# Patient Record
Sex: FEMALE
[De-identification: names, ages, dates, MRNs, and addresses within clinical notes are randomized per-mention and may not be internally consistent; named-entity substitution may affect disease eponyms.]

---

## 2018-05-10 ENCOUNTER — HOSPITAL ENCOUNTER (INPATIENT)
Dept: HOSPITAL 42 - ED | Age: 68
LOS: 2 days | Discharge: HOME | DRG: 83 | End: 2018-05-12
Attending: INTERNAL MEDICINE | Admitting: INTERNAL MEDICINE
Payer: MEDICARE

## 2018-05-10 DIAGNOSIS — Z87.891: ICD-10-CM

## 2018-05-10 DIAGNOSIS — F41.9: ICD-10-CM
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
## 2018-05-10 DIAGNOSIS — Z85.3: ICD-10-CM
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
## 2018-05-10 DIAGNOSIS — E87.0: ICD-10-CM

## 2018-05-10 DIAGNOSIS — E78.00: ICD-10-CM

## 2018-05-10 DIAGNOSIS — R73.03: ICD-10-CM

## 2018-05-10 DIAGNOSIS — S06.6X9A: Primary | ICD-10-CM

## 2018-05-10 DIAGNOSIS — Z90.13: ICD-10-CM

## 2018-05-10 DIAGNOSIS — Z92.3: ICD-10-CM

## 2018-05-10 DIAGNOSIS — N39.0: ICD-10-CM

## 2018-05-10 DIAGNOSIS — E87.5: ICD-10-CM

## 2018-05-10 DIAGNOSIS — Z86.73: ICD-10-CM

## 2018-05-10 DIAGNOSIS — Z92.21: ICD-10-CM

## 2018-05-10 DIAGNOSIS — E83.42: ICD-10-CM

## 2018-05-10 DIAGNOSIS — I10: ICD-10-CM

## 2018-05-10 LAB
ALBUMIN SERPL-MCNC: 4.3 G/DL (ref 3–4.8)
ALBUMIN/GLOB SERPL: 1.4 {RATIO} (ref 1.1–1.8)
ALT SERPL-CCNC: 39 U/L (ref 7–56)
APPEARANCE UR: CLEAR
AST SERPL-CCNC: 33 U/L (ref 14–36)
BACTERIA #/AREA URNS HPF: (no result) /[HPF]
BASOPHILS # BLD AUTO: 0.02 K/MM3 (ref 0–2)
BASOPHILS NFR BLD: 0.2 % (ref 0–3)
BILIRUB UR-MCNC: NEGATIVE MG/DL
BNP SERPL-MCNC: 707 PG/ML (ref 0–450)
BUN SERPL-MCNC: 21 MG/DL (ref 7–21)
CALCIUM SERPL-MCNC: 9.8 MG/DL (ref 8.4–10.5)
COLOR UR: YELLOW
EOSINOPHIL # BLD: 0.1 10*3/UL (ref 0–0.7)
EOSINOPHIL NFR BLD: 0.6 % (ref 1.5–5)
ERYTHROCYTE [DISTWIDTH] IN BLOOD BY AUTOMATED COUNT: 13.5 % (ref 11.5–14.5)
GFR NON-AFRICAN AMERICAN: > 60
GLUCOSE UR STRIP-MCNC: NEGATIVE MG/DL
GRANULOCYTES # BLD: 7.27 10*3/UL (ref 1.4–6.5)
GRANULOCYTES NFR BLD: 75.1 % (ref 50–68)
HDLC SERPL-MCNC: 54 MG/DL (ref 29–60)
HGB BLD-MCNC: 14.4 G/DL (ref 12–16)
LDLC SERPL-MCNC: 92 MG/DL (ref 0–129)
LEUKOCYTE ESTERASE UR-ACNC: (no result) LEU/UL
LYMPHOCYTES # BLD: 1.8 10*3/UL (ref 1.2–3.4)
LYMPHOCYTES NFR BLD AUTO: 18.6 % (ref 22–35)
MCH RBC QN AUTO: 29.8 PG (ref 25–35)
MCHC RBC AUTO-ENTMCNC: 34 G/DL (ref 31–37)
MCV RBC AUTO: 87.8 FL (ref 80–105)
MONOCYTES # BLD AUTO: 0.5 10*3/UL (ref 0.1–0.6)
MONOCYTES NFR BLD: 5.5 % (ref 1–6)
PH UR STRIP: 6 [PH] (ref 4.7–8)
PLATELET # BLD: 246 10^3/UL (ref 120–450)
PMV BLD AUTO: 11.1 FL (ref 7–11)
PROT UR STRIP-MCNC: NEGATIVE MG/DL
RBC # BLD AUTO: 4.83 10^6/UL (ref 3.5–6.1)
RBC # UR STRIP: NEGATIVE /UL
RBC #/AREA URNS HPF: (no result) /HPF (ref 0–2)
SP GR UR STRIP: <= 1.005 (ref 1–1.03)
TROPONIN I SERPL-MCNC: < 0.01 NG/ML
UROBILINOGEN UR STRIP-ACNC: 0.2 E.U./DL
WBC # BLD AUTO: 9.7 10^3/UL (ref 4.5–11)

## 2018-05-10 NOTE — RAD
PROCEDURE:  Radiographs of the pelvis and bilateral hips



HISTORY:

trauma?  



COMPARISON:

None.



FINDINGS:



BONES:

Pelvis: Unremarkable.



Right hip:Unremarkable.



Left hip:Unremarkable.



JOINTS:

Right hip: Unremarkable.



Left hip: Unremarkable.



Sacroiliac Joints: Unremarkable.



Pubic symphysis: Unremarkable.



SOFT TISSUES:

Normal. 



OTHER FINDINGS:

None.



IMPRESSION:

Unremarkable radiographs of the hips and pelvis.

## 2018-05-10 NOTE — CP.PCM.PN
Subjective





- Date & Time of Evaluation


Date of Evaluation: 05/10/18


Time of Evaluation: 17:13





- Subjective


Subjective: 


Spoke to Dr Biggs, radiologist, who reports that there is NO brain edema on 

repeat CT head, and the SAH is diminished in size. 


Will continue to monitor. 





Objective





- Vital Signs/Intake and Output


Vital Signs (last 24 hours): 


 











Temp Pulse Resp BP Pulse Ox


 


 97.9 F   77   12   161/85 H  95 


 


 05/10/18 09:45  05/10/18 14:50  05/10/18 14:50  05/10/18 14:00  05/10/18 14:40











- Medications


Medications: 


 Current Medications





Acetaminophen (Tylenol 325mg Tab)  650 mg PO Q6H PRN


   PRN Reason: Pain, Mild (1-3)


Dexamethasone (Decadron Inj)  4 mg IVP Q6H [facility]


Sodium Chloride (Sodium Chloride 0.9%)  1,000 mls @ 50 mls/hr IV .Q20H [facility]


   Last Admin: 05/10/18 11:27 Dose:  50 mls/hr


Ceftriaxone Sodium (Rocephin 1 Gram Ivpb)  1 gm in 100 mls @ 100 mls/hr IVPB 

DAILY [facility]


   PRN Reason: Protocol


Labetalol HCl (Trandate)  5 mg IV Q4H PRN


   PRN Reason: Systolic Blood Pressure


Metoclopramide HCl (Reglan)  10 mg IVP Q6H PRN


   PRN Reason: Nausea/Vomiting


Morphine Sulfate (Morphine)  2 mg IVP Q4H PRN


   PRN Reason: Pain, moderate (4-7)


   Last Admin: 05/10/18 14:48 Dose:  2 mg


Ondansetron HCl (Zofran Inj)  4 mg IVP Q6H PRN


   PRN Reason: Nausea/Vomiting


   Last Admin: 05/10/18 16:11 Dose:  4 mg


Pantoprazole Sodium (Protonix Inj)  40 mg IVP DAILY [facility]

## 2018-05-10 NOTE — CARD
--------------- APPROVED REPORT --------------





EKG Measurement

Heart [id number]

MA [id number]

[id number]

[id number]

[id number]



<Conclusion>

Normal sinus rhythm

STTW changes

## 2018-05-10 NOTE — CP.PCM.HP
<Geno Momin - Last Filed: 05/10/18 15:42>





History of Present Illness





- History of Present Illness


History of Present Illness: 


CC: post fall in cruise ship.





Patient is 69 yo female with PMHx of HTN, HLD, SVT, breast cancer x2 ( s/p 

surgery, chemo and radiation), anxiety disorder, h/o TIAs brought in post fall 

in cruise [place]. Patient states she doesn't remember the event, only remember 

being in the hospital. As per patient's , patient was walking down the 

stairs when her shoe got caught between some objects causing her to trip and 

fall, hitting the back of her head. As per patient's  patient was verbal 

post event while in the cruise ship.  denies noting any seizure like 

activity. Denies bowel or bladder incontinent. 


Patient currently states the headache has resolved. Denies cp, sob, no nausea, 

vomiting or diarrhea. Was in her normal state of health prior to the event. Was 

in the cruise ship for 5 days.  admits patient had couple of drinks the 

night before. Denies illicit drug use. 





 Patient had CT head in the er, revealing small frontal SAH. 





PMHx: htn, hyperlipidemia, anxiety, SVT, breast cancer x2, h/o TIAs


PSHx: myrna mastectomy, knee surgery, rotator cuff repair, loop recorder in the 

past ( removed). 


FMHx: Alzheimer 


Social: former tobacco , quit 30 years, social alcohol, denies illicit drug 

use. 


Home meds: as per chart.





Present on Admission





- Present on Admission


Any Indicators Present on Admission: No


History of DVT/PE: No


History of Uncontrolled Diabetes: No


Urinary Catheter: No


Decubitus Ulcer Present: No





Review of Systems





- EENT


Eyes: absent: Blurred Vision, Change in Vision, Diplopia, Itchy Eyes, Sees 

Flashes


Ears: absent: Decreased Hearing, Disequilibrium, Dizziness


Nose/Mouth/Throat: absent: As Per HPI, Epistaxis, Nasal Congestion, Nasal 

Discharge, Nasal Obstruction, Nasal Trauma, Nose Pain, Post Nasal Drip, Sinus 

Pain, Sinus Pressure, Bleeding Gums, Change in Voice, Dental Pain, Dry Mouth, 

Dysphagia, Halitosis, Hoarsness, Lip Swelling, Mouth Lesions, Mouth Pain, 

Odynophagia, Sore Throat, Throat Swelling, Tongue Swelling, Facial Pain, Neck 

Pain, Neck Mass, Other





- Cardiovascular


Cardiovascular: absent: As Per HPI, Acrocyanosis, Chest Pain, Chest Pain at Rest

, Chest Pain with Activity, Claudication, Diaphoresis, Dyspnea, Dyspnea on 

Exertion, Edema, Irregular Heart Rhythm, Pain Radiating to Arm/Neck/Jaw, Leg 

Edema, Leg Ulcers, Lightheadedness, Orthopnea, Palpitations, Paroxysmal 

Nocturnal Dyspnea, Pedal Edema, Radiating Pain, Rapid Heart Rate, Slow Heart 

Rate, Syncope, Other





- Respiratory


Respiratory: absent: As Per HPI, Cough, Dyspnea, Hemoptysis, Dyspnea on Exertion

, Wheezing, Snoring, Stridor, Pain on Inspiration, Chest Congestion, Excessive 

Mucous Production, Change in Mucous Color, Pain with Coughing, Other





- Gastrointestinal


Gastrointestinal: absent: As Per HPI, Abdominal Pain, Belching, Bloating, 

Change in Bowel Habits, Change in Stool Character, Coffee Ground Emesis, 

Constipation, Cramping, Diarrhea, Dyspepsia, Dysphagia, Early Satiety, 

Excessive Flatus, Fecal Incontinence, Heartburn, Hematemesis, Hematochezia, 

Loose Stools, Melena, Nausea, Odynophagia, Temesmus, Vomiting, Other





- Genitourinary


Genitourinary: absent: Dysuria, Hematuria, Nocturia, Urinary Frequency, Freq UTI





- Musculoskeletal


Musculoskeletal: absent: As Per HPI, Abnormal Gait, Arthralgias, Atrophy, Back 

Pain, Deformity, Joint Swelling, Limited Range of Motion, Loss of Height, 

Muscle Cramps, Muscle Weakness, Myalgias, Neck Pain, Numbness, Radiating Pain 

into Limb, Stiffness, Tingling, Other





- Integumentary


Integumentary: absent: Dry Skin, Swelling





- Neurological


Neurological: Syncope.  absent: Abnormal Hearing, Abnormal Speech, Burning 

Sensations, Disequilibrium, Dizziness, Numbness, Frequent Falls, Headaches, 

Loss of Vision, Paresthesias, Restless Legs, Tingling, Tremor, Vertigo, Weakness





- Psychiatric


Psychiatric: absent: Confusion, Depression





- Endocrine


Endocrine: absent: Flushing, Polydipsia, Polyphagia, Polyuria





- Hematologic/Lymphatic


Hematologic: absent: Easy Bleeding





Past Patient History





- Infectious Disease


Hx of Infectious Diseases: None





- Tetanus Immunizations


Tetanus Immunization: Unknown





- Past Medical History & Family History


Past Medical History?: Yes





- Past Social History


Smoking Status: Former Smoker


Alcohol: Occasional


Drugs: Denies


Home Situation {Lives}: With Family





- CARDIAC


Hx Hypertension: Yes





- PULMONARY


Hx Respiratory Disorders: No





- GENITOURINARY/GYNECOLOGICAL


Other/Comment: breast ca





- PSYCHIATRIC


Hx Psychophysiologic Disorder: No


Hx Substance Use: No





- ANESTHESIA


Hx Anesthesia: Yes


Hx Anesthesia Reactions: No





Meds


Allergies/Adverse Reactions: 


 Allergies











Allergy/AdvReac Type Severity Reaction Status Date / Time


 


Sulfa (Sulfonamide Allergy  RASH Verified 05/10/18 09:43





Antibiotics)     














Physical Exam





- Constitutional


Appears: No Acute Distress, Older Than Stated Age





- Head Exam


Head Exam: ATRAUMATIC, NORMAL INSPECTION, NORMOCEPHALIC





- Eye Exam


Eye Exam: EOMI, Normal appearance, PERRL.  absent: Scleral icterus


Pupil Exam: NORMAL ACCOMODATION





- ENT Exam


ENT Exam: Mucous Membranes Moist





- Neck Exam


Neck exam: Positive for: Normal Inspection





- Respiratory Exam


Respiratory Exam: Clear to Auscultation Bilateral, NORMAL BREATHING PATTERN.  

absent: Rales, Rhonchi, Wheezes, Respiratory Distress, Stridor





- Cardiovascular Exam


Cardiovascular Exam: REGULAR RHYTHM, RRR, +S1, +S2.  absent: Bradycardia, 

Tachycardia, Diastolic murmur, Gallop, Systolic Murmur





- GI/Abdominal Exam


GI & Abdominal Exam: Normal Bowel Sounds, Soft.  absent: Distended, Firm, 

Guarding, Rebound, Rigid, Tenderness





- Extremities Exam


Extremities exam: Positive for: normal inspection.  Negative for: pedal edema, 

tenderness





- Back Exam


Back exam: NORMAL INSPECTION.  absent: vertebral tenderness





- Neurological Exam


Neurological exam: Alert, CN II-XII Intact, Oriented x3, Reflexes Normal





- Psychiatric Exam


Psychiatric exam: Anxious





- Skin


Skin Exam: Dry, Normal Color





Results





- Vital Signs


Recent Vital Signs: 





 Last Vital Signs











Temp  97.9 F   05/10/18 09:45


 


Pulse  76   05/10/18 13:12


 


Resp  18   05/10/18 13:12


 


BP  174/101 H  05/10/18 13:12


 


Pulse Ox  98   05/10/18 13:12














- Labs


Result Diagrams: 


 05/10/18 10:00





 05/10/18 10:00


Labs: 





 Laboratory Results - last 24 hr











  05/10/18





  12:15


 


Blood Type Confirm  O POSITIVE














- EKG Data


EKG Interpreted by: Myself


EKG shows normal: Sinus rhythm


Rate: Normal





Assessment & Plan





- Assessment and Plan (Free Text)


Assessment: 


Patient is 69 yo female with PMHx of HTN, HLD, SVT, breast cancer x2 ( s/p 

surgery, chemo and radiation), anxiety disorder, h/o TIAs brought in post fall 

in cruise ship.


Plan: 


1) Fall likely syncopal episode  due to cardiac (giving h/o SVTs) r/o AS, 

versus CVA versus metabolic, alcohol intoxication, infectious, r/o seizures  


  - Patient to be monitored in the ICU due to traumatic brain hemorrhage 


  - Trop neg x1, will trend


  - echo ordered


  - Cardiologist consulted 


  - CT with small mount of post traumatic subarachnoid hemorrhage on anterior 

surface of left frontal lobe- neurosurgery was consulted, recommending repeat 

CT head in 6 hours to evaluate for expansion


  - CT cervical with no acute finding. 


 - Neurology consulted 


 - Will maintain BP below 170/100.


 - TSH ordered, 


 - Electrolytes are being corrected 


 - UTI is being treated


 - Glucose was normal on arrival 


 - alcohol level ordered, utox normal 


 - Neuro check q2hr


 - Avoid hypotensive, hypoglycemic episodes 


 - Carotid us ordered. 


 - Speech and swallow.


 - fall precaution


 - Avoid ASA, and anticoags. 


 - Hpi/pelvic x-ray normal, Morphine prn for pain.


 








2) Uncontrolled htn - labetalol prn to maintain SBP below 170.


                           - will hold po meds. 








3) Hypernatremia- Gentle hydration 








4) Hyperkalemia- likely due to acei, kayexalate ordered





5) Hypomagnesemia- being repleted. 





6) UTI- 


    - urine culture pending


    - afebrile, no leukocytosis


    - s/p Rocephin in the ED, will continue for now. 








7) h/o SVT- ekg normal, will monitor tele








8) Elevated pro bnp- no signs of fluid overload, patient lying flat in bed, and 

able to speak in full sentences without being sob. 





9) DVT/gi prophylaxis: VTE device and protonix. 





Patient seen, examined and case discussed with Dr Manning. 





- Date & Time


Date: 05/10/18


Time: 13:35





<Windy Manning - Last Filed: 05/10/18 15:56>





Results





- Vital Signs


Recent Vital Signs: 





 Last Vital Signs











Temp  97.9 F   05/10/18 09:45


 


Pulse  77   05/10/18 14:50


 


Resp  12   05/10/18 14:50


 


BP  161/85 H  05/10/18 14:00


 


Pulse Ox  95   05/10/18 14:40














- Labs


Result Diagrams: 


 05/10/18 10:00





 05/10/18 10:00


Labs: 





 Laboratory Results - last 24 hr











  05/10/18 05/10/18 05/10/18





  12:15 13:50 13:50


 


Triglycerides    127


 


Cholesterol    174


 


LDL Cholesterol Direct    92


 


HDL Cholesterol    54


 


TSH 3rd Generation   1.65 


 


Alcohol, Quantitative   < 10 


 


Blood Type Confirm  O POSITIVE  














Attending/Attestation





- Attestation


I have personally seen and examined this patient.: Yes


I have fully participated in the care of the patient.: Yes


I have reviewed all pertinent clinical information: Yes


Notes (Text): 





05/10/18 15:50


68 year old female with past medical history of hypertension, dyslipidemia, SVT

, anxiety, breast cancer s/p surgery, and history of TIAs who presented s/p 

fall with possible syncopal episode.  CT head showed small amount of post-

traumatic subarachnoid hemorrhage left frontal lobe.  Neurosurgery was notified 

who recommended to repeat CT head in 6 hours.  Neurology and cardiology 

evaluation are requested.  Will obtain serial cardiac enzymes, echocardiogram, 

carotid dopplers.  Urine drug scree and alcohol level are ordered.  Will 

monitor in ICU.


Started on antibiotics for possible UTI while awaiting Ucx.


She was given kayexalate for hyperkalemia.  Will monitor and hold zestril for 

now.


Will replete and repeat magnesium.  Monitor sodium for mild hypernatremia.


Can start lopressor or labetalol prn for hypertension.





Windy Manning MD


Hospitalist.

## 2018-05-10 NOTE — US
PROCEDURE:  Bilateral carotid artery duplex ultrasound 



HISTORY:

Carotid stenosis 



PHYSICIAN(S):  Brooks Spencer MD.



TECHNIQUE:

Duplex sonography and color-flow Doppler were used to evaluate the 

carotid bifurcations and limited segments of the vertebral arteries 

bilaterally.



FINDINGS:

There is mild smooth heterogeneous plaque noted at the carotid 

bifurcations bilaterally. The peak systolic velocity in the proximal 

right internal carotid artery is 88 cm/sec. This corresponds to a 20 

to 39% proximal right ICA stenosis. Normal systolic velocities are 

noted in the proximal right external carotid artery. There is 

antegrade flow in the right vertebral artery.



The peak systolic velocity in the proximal left internal carotid 

artery is 82 cm/sec. This corresponds to a 20 to 39% proximal left 

ICA stenosis. Normal systolic velocities are noted in the proximal 

left external carotid artery. There is antegrade flow in the left 

vertebral artery.



IMPRESSION:

1. Bilateral 20-39% proximal ICA stenoses.



2. Antegrade flow in both vertebral arteries.

## 2018-05-10 NOTE — CT
PROCEDURE:  CT Cervical Spine without contrast



HISTORY:

head injury, altered?



COMPARISON:

None available.



TECHNIQUE:

Axial computed tomography images were obtained of the cervical spine 

without the use of intravenous contrast. Coronal and sagittal 

reformatted images were created and reviewed.



Radiation dose: 



Total exam DLP = 635 mGy-cm.



This CT exam was performed using one or more of the following dose 

reduction techniques: Automated exposure control, adjustment of the 

mA and/or kV according to patient size, and/or use of iterative 

reconstruction technique.



FINDINGS:



VERTEBRAE:

No fracture. Normal alignment. No destructive bony lesion.



DISCS/SPINAL CANAL/NEURAL FORAMINA:

No significant central canal or neural foraminal stenosis. Small 

central disc protrusion at C2-3



PARASPINAL SOFT TISSUES:

Unremarkable. 



OTHER FINDINGS:

None.



IMPRESSION:

No acute findings

## 2018-05-10 NOTE — CP.PCM.PN
Subjective





- Date & Time of Evaluation


Date of Evaluation: 05/10/18


Time of Evaluation: 19:10





- Subjective


Subjective: 





small amount of traumatic SAH seen on CT


repeat shows furhter decrese in blood


no neurosurgical intervention indicated


can be d/c when otherwise medically clear





Objective





- Vital Signs/Intake and Output


Vital Signs (last 24 hours): 


 











Temp Pulse Resp BP Pulse Ox


 


 97.9 F   66   16   158/76 H  99 


 


 05/10/18 09:45  05/10/18 17:50  05/10/18 17:50  05/10/18 17:22  05/10/18 17:50











- Medications


Medications: 


 Current Medications





Acetaminophen (Tylenol 325mg Tab)  650 mg PO Q6H PRN


   PRN Reason: Pain, Mild (1-3)


Sodium Chloride (Sodium Chloride 0.9%)  1,000 mls @ 50 mls/hr IV .Q20H [facility]


   Last Admin: 05/10/18 11:27 Dose:  50 mls/hr


Ceftriaxone Sodium (Rocephin 1 Gram Ivpb)  1 gm in 100 mls @ 100 mls/hr IVPB 

DAILY [facility]


   PRN Reason: Protocol


Labetalol HCl (Trandate)  5 mg IV Q4H PRN


   PRN Reason: Systolic Blood Pressure


Metoclopramide HCl (Reglan)  10 mg IVP Q6H PRN


   PRN Reason: Nausea/Vomiting


   Last Admin: 05/10/18 17:44 Dose:  10 mg


Morphine Sulfate (Morphine)  2 mg IVP Q4H PRN


   PRN Reason: Pain, moderate (4-7)


   Last Admin: 05/10/18 14:48 Dose:  2 mg


Ondansetron HCl (Zofran Inj)  4 mg IVP Q6H PRN


   PRN Reason: Nausea/Vomiting


   Last Admin: 05/10/18 16:11 Dose:  4 mg


Pantoprazole Sodium (Protonix Inj)  40 mg IVP DAILY [facility]

## 2018-05-10 NOTE — CT
PROCEDURE:  CT HEAD WITHOUT CONTRAST.



HISTORY:

head injury, possible LOC, bleed?



COMPARISON:

None available. 



TECHNIQUE:

Axial computed tomography images were obtained through the head/brain 

without intravenous contrast.  



Radiation dose:



Total exam DLP = 910 mGy-cm.



This CT exam was performed using one or more of the following dose 

reduction techniques: Automated exposure control, adjustment of the 

mA and/or kV according to patient size, and/or use of iterative 

reconstruction technique.



FINDINGS:



HEMORRHAGE:

There is a small amount of subarachnoid blood on the surface of the 

anterior left frontal lobe. The finding is seen on images 34-40 of 

series 4.



I discussed the findings with the physician's assistant Layne at 10:45 

a.m. 



BRAIN:

No mass effect or edema.  No atrophy or chronic microvascular 

ischemic changes.



VENTRICLES:

Unremarkable. No hydrocephalus. 



CALVARIUM:

Unremarkable.



PARANASAL SINUSES:

Unremarkable as visualized. No significant inflammatory changes.



MASTOID AIR CELLS:

Unremarkable as visualized. No inflammatory changes.



OTHER FINDINGS:

None.



IMPRESSION:

Small amount of posttraumatic subarachnoid hemorrhage on the anterior 

surface of the left frontal lobe.

## 2018-05-10 NOTE — ED PDOC
Arrival/HPI





- General


Chief Complaint: Trauma


Time Seen by Provider: 05/10/18 09:47


Historian: Patient





- History of Present Illness


Narrative History of Present Illness (Text): 





05/10/18 10:05


67 y/o female, pmh including htn/hyperlipidemia, psychiatric history including 

mild anxiety, sulfa allergy, , GCS 15, alert and oriented to person and 

time but not to place, biba s[place] cruise [place] this [place], limited 

HPI can be obtained as the patient doesn't remember what happen.  Pt. stated 

that she only recall that she was at the cruise ship this morning with her 

boyfriend (not at the bedside or in the ER), woke up at 7am and doesn't 

remember what happened afterward.  As per EMS, pt. fall about 8 steps this 

morning with head injury which she found to be appear to be "altered" during 

the transport to the ER.  Pt. has no urinary or bowel incontinence or retention

, no tremors or seizure like activity.  Pt. is in the ER only alert to her name/

date of birth and today's date but not sure where she is at and how she got 

here.  Pt.'s only medical and psychological complaints are "I have a headache 

and I want to see my boyfriend."  Pt. has no homicidal or suicidal ideation, no 

auditory or visual hallucination, no abdominal or pelvic pain, no back or rib 

pain, no hematuria, no dizziness, no change in vision, no palpitation, no rash, 

no numbness or tingling, no slurred speech. 





05/10/18 11:20


Additional HPI obtained from the boyfriend radha, just arrived at the ER.  

Pt. possibly tripped over the carpted floor, tripped over 3 steps, twisted the 

body 3 steps and hit the head on the last 2-3 steps, found to be confused after 

the fall. 











Past Medical History





- Provider Review


Nursing Documentation Reviewed: Yes





- Infectious Disease


Hx of Infectious Diseases: None





- Reproductive


Menopause: Yes





- Cardiac


Hx Hypertension: Yes





- Pulmonary


Hx Respiratory Disorders: No





- Genitourinary/Gynecological


Other/Comment: breast ca





- Psychiatric


Hx Psychophysiologic Disorder: No


Hx Substance Use: No





- Anesthesia


Hx Anesthesia: Yes


Hx Anesthesia Reactions: No





Family/Social History





- Physician Review


Nursing Documentation Reviewed: Yes


Family/Social History: Unknown Family HX


Smoking Status: Unknown If Ever Smoked


Hx Alcohol Use: Yes


Frequency of alcohol use: Socially


Hx Substance Use: No





Allergies/Home Meds


Allergies/Adverse Reactions: 


Allergies





Sulfa (Sulfonamide Antibiotics) Allergy (Verified 05/10/18 09:43)


 RASH








Home Medications: 


 Home Meds











 Medication  Instructions  Recorded  Confirmed


 


Lisinopril [Zestril] 40 mg PO DAILY 05/10/18 05/10/18


 


Metoprolol Tartrate [Lopressor] 50 mg PO BID 05/10/18 05/10/18


 


Rosuvastatin Calcium [Crestor] 10 mg PO DAILY 05/10/18 05/10/18


 


busPIRone [Buspar] 5 mg PO DAILY 05/10/18 05/10/18














Review of Systems





- Review of Systems


Constitutional: absent: Fatigue, Fevers


Eyes: absent: Vision Changes


ENT: absent: Hearing Changes, Rhinorrhea


Respiratory: absent: SOB, Cough


Cardiovascular: Other (syncope?).  absent: Chest Pain, Palpitations


Gastrointestinal: absent: Abdominal Pain, Nausea


Musculoskeletal: absent: Arthralgias, Back Pain, Neck Pain, Myalgias


Skin: absent: Rash, Pruritis


Neurological: Headache.  absent: Dizziness, Focal Weakness, Gait Changes, 

Speech Changes, Facial Droop, Disequilibrium, Seizure


Psychiatric: absent: Anxiety, Depression, Suicidal Ideation





Physical Exam


Vital Signs Reviewed: Yes


Vital Signs











  Temp Pulse Resp BP Pulse Ox


 


 05/10/18 13:10   86  41 H  


 


 05/10/18 13:05   84  20  


 


 05/10/18 11:53   82  18  173/94 H  97


 


 05/10/18 10:24     186/97 H 


 


 05/10/18 09:45  97.9 F  83  16  173/122 H  97











Temperature: Afebrile


Blood Pressure: Hypertensive


Pulse: Regular


Respiratory Rate: Normal


Appearance: Positive for: Well-Appearing, Non-Toxic, Comfortable


Pain Distress: Mild


Mental Status: Positive for: Confused


Finger Stick Blood Glucose: 108





- Systems Exam


Head: Present: Atraumatic, Normocephalic, Other (no facial tenderness or 

swelling).  No: Tenderness, Contusion, Swelling, Ecchymosis, Abrasion, 

Laceration


Pupils: Present: PERRL


Extroacular Muscles: Present: EOMI


Conjunctiva: Present: Normal


Ears: Present: NORMAL TM, Normal Canal


Mouth: Present: Moist Mucous Membranes


Pharnyx: No: ERYTHEMA, EXUDATE, TONSILS ENLARGED


Nose (External): Present: Atraumatic.  No: Abrasion, Contusion, Laceration


Nose (Internal): Present: Normal Inspection, No Active Bleeding.  No: Rhinorrhea

, Septal Hematoma, Epistaxis


Neck: Present: Normal Range of Motion, Trachea Midline.  No: Meningeal Signs, 

MIDLINE TENDERNESS, Paraspinal Tenderness, Lymphadenopathy


Respiratory/Chest: Present: Clear to Auscultation, Good Air Exchange.  No: 

Respiratory Distress, Accessory Muscle Use, Wheezes, Decreased Breath Sounds, 

Rales, Retracting, Rhonchi, Tachypneic


Cardiovascular: Present: Regular Rate and Rhythm, Normal S1, S2.  No: Murmurs


Abdomen: No: Tenderness, Distention, Peritoneal Signs, Rebound, Guarding


Rectal: Present: Other (Pt. declined)


Back: Present: Normal Inspection.  No: CVA Tenderness, Midline Tenderness, 

Paraspinal Tenderness, Pain with Leg Raise, Decubitus Ulcer


Upper Extremity: Present: Normal Inspection, Normal ROM, NORMAL PULSES, 

Neurovascularly Intact, Capillary Refill < 2s.  No: Cyanosis, Edema, Tenderness

, Swelling, Deformity


Lower Extremity: Present: Normal Inspection, NORMAL PULSES, Normal ROM, 

Neurovascularly Intact, Capillary Refill < 2 s.  No: Edema, Tenderness, Swelling

, Deformity


Neurological: Present: GCS=15, CN II-XII Intact, Speech Normal, Motor Func 

Grossly Intact, Gait Normal, Other (Normal finger to nose test, normal heel to 

shin test, no drift, walking with normal gait and posture, full sensation 

intact to sharp and dull, no focal neurological deficits, GCS 15, NIHSS is 0)


Skin: Present: Warm, Dry, Normal Color.  No: Rashes


Psychiatric: Present: Alert, Normal Insight, Normal Concentration, Normal Affect





Medical Decision Making


ED Course and Treatment: 





05/10/18 10:15


-Labs/cardiac enzyme


-UA/UDS/Alcohol level


-EKG


-CT head/cervical


-Chest/Hip/Pelvis Xrays


-IVF/morphine 4mg for headache


-Cardiac monitor


-Case discussed with DR. Stevens


-Observe and reassess





05/10/18 10:56


-NIHSS is 0


-EKG: NSR @ 81 BPM, no ST elevation or depression, no T wave inversion, no 

pervious ekg available for comparison


-Chest ray no active disease


-Hip/pelvis xray no fracture or dislocation


-CT head Small amount of posttraumatic subarachnoid hemorrhage on the anterior 

surface of the left frontal lobe.


-CT cervical No significant central canal or neural foraminal stenosis. Small 

central disc protrusion at C2-3. Pt. has no neck pain and no midline tenderness

, no neck or upper extremity pain.


-Labs show no acute findings except K+ 5.3 (kayaxylate po), Mg 1.5 (MgSu 1gm IV 

ordered)


- (Fluid decreased to 50cc/hr),


-Troponin is negative


-UA show +UTI, IV rocephine ordered. 


-UDS is negative.


-Alcohol level


-Pt. is still neurologically intact, no focal neuroloficits, no changes 

compared to last examination. 


-Neurosurgery Dr. Zuniga paged for emergent consult. Pt. will need ICU 

admission. 


-Case discussed with Dr. Stevens, reviewed labs/radiology result and agreed 

on the plan of care





05/10/18 11:08


-Dr. Zuniga call me back, discussed about the result/vital signs/physical 

examination, suggest to repeat CT head in 6 hours and if no change then the 

patient is clear from neurosurgical point of view, no other emergent 

intervention indicated from his point of view. 


-Dr. Jason is traveling and not taking call


-Pt. is currently aox3, I reviewed all labs/radiology result and consults 

recommendation with the patient and the boyfriend Radha (pt. agreed that he 

can know all her results and consult)


-Paging hospitalist/ICU (incase the bleeding worsened 6 hours from now) and 

will need continuous serial neurological examination. Note: the fall appear to 

be mechanical but I can not exclude this is not cardiogenic syncope as the 

patient can not provide full history to me.  





05/10/18 11:26


-I spoke to the ICU Dr. Mendoza, discussed about the case/radiology result/labs

/neurosurgery evaluation, will come to evaluate the patient. 





05/10/18 11:51


-Dr. Mendoza evaluated the patient and will take the patient to ICU for 24 

hours until the repeat CT is resulted.


-I spoke to the hospitalist Dr. Kerri Temple, all labs/radiology/ICU Dr. Villarreal

/Neurosrgery Dr. Zuniga discussed, agreed to admit the patient to his 

service which he will continue the care and repeat CT head in 6 hours from 

initially.


-Case discussed/labs reviewed with Dr. Stevens, he agreed on the treatment 

and admission plan. 




















Reassessment Condition: Re-examined, Improving,but remains with symptoms





- Critical Care


Critical Care Minutes: 45 minutes


Narrative Critical Care (Text): 





05/10/18 11:55


Subarachnoid bleeding of the brain, trauma, neurosurgery consult, ICU consult, 

Medicine consult, neurological examination with series, cardiac monitoring. 





- Lab Interpretations


Lab Results: 








 05/10/18 10:00 





 05/10/18 10:00 





 Lab Results





05/10/18 11:20: Blood Type O POSITIVE, Antibody Screen Negative, BBK History 

Checked No verified bt


05/10/18 10:19: Urine Color Yellow, Urine Appearance Clear, Urine pH 6.0, Ur 

Specific Gravity <= 1.005, Urine Protein Negative, Urine Glucose (UA) Negative, 

Urine Ketones Negative, Urine Blood Negative, Urine Nitrate Negative, Urine 

Bilirubin Negative, Urine Urobilinogen 0.2, Ur Leukocyte Esterase Trace H, 

Urine RBC 0 - 2, Urine WBC 5 - 10, Ur Epithelial Cells 3 - 4, Urine Bacteria Few


05/10/18 10:19: Urine Opiates Screen Negative, Urine Methadone Screen Negative, 

Ur Barbiturates Screen Negative, Ur Phencyclidine Scrn Negative, Ur 

Amphetamines Screen Negative, U Benzodiazepines Scrn Negative, U Oth Cocaine 

Metabols Negative, U Cannabinoids Screen Negative


05/10/18 10:00: WBC 9.7, RBC 4.83, Hgb 14.4, Hct 42.4, MCV 87.8, MCH 29.8, MCHC 

34.0, RDW 13.5, Plt Count 246, MPV 11.1 H, Gran % 75.1 H, Lymph % (Auto) 18.6 L

, Mono % (Auto) 5.5, Eos % (Auto) 0.6 L, Baso % (Auto) 0.2, Gran # 7.27 H, 

Lymph # (Auto) 1.8, Mono # (Auto) 0.5, Eos # (Auto) 0.1, Baso # (Auto) 0.02


05/10/18 10:00: Sodium 149 H, Potassium 5.3 H, Chloride 110 H, Carbon Dioxide 29

, Anion Gap 16, BUN 21, Creatinine 0.8, Est GFR (African Amer) > 60, Est GFR (

Non-Af Amer) > 60, Random Glucose 121 H, Calcium 9.8, Magnesium 1.5 L, Total 

Bilirubin 1.0, AST 33, ALT 39, Alkaline Phosphatase 94, Lactate Dehydrogenase 

590, Total Creatine Kinase 108, Troponin I < 0.01, NT-Pro-B Natriuret Pep 707 H

, Total Protein 7.3, Albumin 4.3, Globulin 3.0, Albumin/Globulin Ratio 1.4


05/10/18 09:39: POC Glucose (mg/dL) 108








I have reviewed the lab results: Yes





- RAD Interpretation


Radiology Orders: 








05/10/18 10:01


CERVICAL SPINE W/O CONTRAST [CT] Stat 


HEAD W/O CONTRAST [CT] Stat 





05/10/18 10:02


Hip Bilateral [HIP MIN 5V W/ PELVIS GIULIANO] [RAD] Stat 





05/10/18 10:03


CHEST PORTABLE [RAD] Stat 











-Chest ray


LUNGS:


No active pulmonary disease.





PLEURA:


No significant pleural effusion identified, no pneumothorax apparent.





CARDIOVASCULAR:


Normal.





OSSEOUS STRUCTURES:


No significant abnormalities.





VISUALIZED UPPER ABDOMEN:


Normal.





OTHER FINDINGS:


None.





IMPRESSION:


No active disease.





--------------------------------------------------------------------------------

-------------------------------


-Hip/pelvis xray





HISTORY:


trauma?  





COMPARISON:


None.





FINDINGS:





BONES:


Pelvis: Unremarkable.





Right hip:Unremarkable.





Left hip:Unremarkable.





JOINTS:


Right hip: Unremarkable.





Left hip: Unremarkable.





Sacroiliac Joints: Unremarkable.





Pubic symphysis: Unremarkable.





SOFT TISSUES:


Normal. 





OTHER FINDINGS:


None.





IMPRESSION:


Unremarkable radiographs of the hips and pelvis.





--------------------------------------------------------------------------------

-------------------------------


-CT head


HEMORRHAGE:


There is a small amount of subarachnoid blood on the surface of the anterior 

left frontal lobe. The finding is seen on images 34-40 of series 4.





I discussed the findings with the physician's assistant Layne at 10:45 a.m. 





BRAIN:


No mass effect or edema.  No atrophy or chronic microvascular ischemic changes.





VENTRICLES:


Unremarkable. No hydrocephalus. 





CALVARIUM:


Unremarkable.





PARANASAL SINUSES:


Unremarkable as visualized. No significant inflammatory changes.





MASTOID AIR CELLS:


Unremarkable as visualized. No inflammatory changes.





OTHER FINDINGS:


None.





IMPRESSION:


Small amount of posttraumatic subarachnoid hemorrhage on the anterior surface 

of the left frontal lobe.





--------------------------------------------------------------------------------

-------------------------------


-CT cervical 





HISTORY:


head injury, altered?





COMPARISON:


None available.





TECHNIQUE:


Axial computed tomography images were obtained of the cervical spine without 

the use of intravenous contrast. Coronal and sagittal reformatted images were 

created and reviewed.





Radiation dose: 





Total exam DLP = 635 mGy-cm.





This CT exam was performed using one or more of the following dose reduction 

techniques: Automated exposure control, adjustment of the mA and/or kV 

according to patient size, and/or use of iterative reconstruction technique.





FINDINGS:





VERTEBRAE:


No fracture. Normal alignment. No destructive bony lesion.





DISCS/SPINAL CANAL/NEURAL FORAMINA:


No significant central canal or neural foraminal stenosis. Small central disc 

protrusion at C2-3





PARASPINAL SOFT TISSUES:


Unremarkable. 





OTHER FINDINGS:


None.





IMPRESSION:


No acute findings





: Radiologist





- EKG Interpretation


EKG Interpretation (Text): 





05/10/18 10:18


-EKG: NSR @ 81 BPM, no ST elevation or depression, no T wave inversion, no 

pervious ekg available for comparison





Interpreted by ED Physician: Yes


Type: 12 lead EKG


Comparison: No previous EKG avail.





- Medication Orders


Current Medication Orders: 








Acetaminophen (Tylenol 325mg Tab)  650 mg PO Q6H PRN


   PRN Reason: Pain, Mild (1-3)


Sodium Chloride (Sodium Chloride 0.9%)  1,000 mls @ 50 mls/hr IV .Q20H JARRELL


   Last Admin: 05/10/18 11:27  Dose: 50 mls/hr





eMAR Start Stop


 Document     05/10/18 11:27  GMD  (Rec: 05/10/18 11:27  GMD  [id number])


     Intravenous Solution


      Start Date                                 05/10/18


      Start Time                                 11:27





Ceftriaxone Sodium (Rocephin 1 Gram Ivpb)  1 gm in 100 mls @ 100 mls/hr IVPB 

DAILY JARRELL


   PRN Reason: Protocol


Labetalol HCl (Trandate)  5 mg IV Q4H PRN


   PRN Reason: Systolic Blood Pressure


Metoclopramide HCl (Reglan)  10 mg IVP Q6H PRN


   PRN Reason: Nausea/Vomiting


   Last Admin: 05/10/18 17:44  Dose: 10 mg





IVP Administration


 Document     05/10/18 17:44  KA  (Rec: 05/10/18 17:44  [address]


     Charges for Administration


      # of IVP Administrations                   1





Morphine Sulfate (Morphine)  2 mg IVP Q4H PRN


   PRN Reason: Pain, moderate (4-7)


   Last Admin: 05/10/18 14:48  Dose: 2 mg





MAR Pain Assessment


 Document     05/10/18 14:48  KA  (Rec: 05/10/18 14:48  [address]


     Pain Reassessment


      Is this a pain reassessment?               No


     Sleep


      Is patient sleeping during reassessment?   No


     Presence of Pain


      Presence of Pain                           Yes


     Pain Scale Used


      Pain Scale Used                            Numeric


     Location


      Left, Right or Bilateral                   Bilateral


      Pain Location Body Site                    Head


     Description


      Description                                Constant


      Intensity of Pain at present               6


      Pain Behavior                              Moaning


      Alleviating Factors/Management             Medication


       Techniques                                


      Alleviating Factors                        Medication


IVP Administration


 Document     05/10/18 14:48  KA  (Rec: 05/10/18 14:48  [address]


     Charges for Administration


      # of IVP Administrations                   1


Re-Assess: MAR Pain Assessment


 Document     05/10/18 15:48  KAC  (Rec: 05/10/18 16:25  [id number])


     Pain Reassessment


      Is this a pain reassessment?               Yes


     Sleep


      Is patient sleeping during reassessment?   No


     Presence of Pain


      Presence of Pain                           Yes


     Pain Scale Used


      Pain Scale Used                            Numeric


     Location


      Left, Right or Bilateral                   Bilateral


      Pain Location Body Site                    Head


     Description


      Description                                Intermittent


      Intensity of Pain at present               3





Ondansetron HCl (Zofran Inj)  4 mg IVP Q6H PRN


   PRN Reason: Nausea/Vomiting


   Last Admin: 05/10/18 16:11  Dose: 4 mg





IVP Administration


 Document     05/10/18 16:11  [facility]  (Rec: 05/10/18 16:11  [id number])


     Charges for Administration


      # of IVP Administrations                   1





Pantoprazole Sodium (Protonix Inj)  40 mg IVP DAILY JARRELL





Discontinued Medications





Sodium Chloride (Sodium Chloride 0.9%)  1,000 mls @ 100 mls/hr IV .Q10H JARRELL


   Last Admin: 05/10/18 10:09  Dose: 100 mls/hr





eMAR Start Stop


 Document     05/10/18 10:09  GMD  (Rec: 05/10/18 10:09  GMD  [id number])


     Intravenous Solution


      Start Date                                 05/10/18


      Start Time                                 10:09





Magnesium Sulfate/Dextrose (Magnesium Sulfate 1 Gm/100 Ml D5w)  1 gm in 100 mls 

@ 100 mls/hr IVPB ONCE ONE


   Stop: 05/10/18 11:50


   Last Admin: 05/10/18 11:58  Dose: 100 mls/hr





eMAR Start Stop


 Document     05/10/18 11:58  GMD  (Rec: 05/10/18 11:58  GMD  [id number])


     Intravenous Solution


      Start Date                                 05/10/18


      Start Time                                 11:58


      End Date                                   05/10/18


      End time                                   12:58


      Total Infusion Time                        60





Ceftriaxone Sodium (Rocephin 1 Gram Ivpb)  1 gm in 100 mls @ 200 mls/hr IVPB 

STAT STA


   PRN Reason: Protocol


   Stop: 05/10/18 11:21


   Last Admin: 05/10/18 11:27  Dose: 200 mls/hr





eMAR Start Stop


 Document     05/10/18 11:27  GMD  (Rec: 05/10/18 11:27  GMD  [id number])


     Intravenous Solution


      Start Date                                 05/10/18


      Start Time                                 11:27


      End Date                                   05/10/18


      End time                                   11:57


      Total Infusion Time                        30





Morphine Sulfate (Morphine)  4 mg IVP STAT STA


   Stop: 05/10/18 11:09


   Last Admin: 05/10/18 11:50  Dose: 4 mg





MAR Pain Assessment


 Document     05/10/18 11:50  GMD  (Rec: 05/10/18 11:51  GMD  [id number])


     Pain Reassessment


      Is this a pain reassessment?               No


     Presence of Pain


      Presence of Pain                           Yes


     Location


      Pain Location Body Site                    Head


     Description


      Description                                Constant


      Intensity of Pain at present               6


IVP Administration


 Document     05/10/18 11:50  GMD  (Rec: 05/10/18 11:51  GMD  [id number])


     Charges for Administration


      # of IVP Administrations                   1





Sodium Polystyrene Sulfonate (Kayexalate Susp)  30 gm NM STAT STA


   Stop: 05/10/18 11:04


   Last Admin: 05/10/18 11:58  Dose: 30 gm











- PA / NP / Resident Statement


MD/DO has reviewed & agrees with the documentation as recorded.





Disposition/Present on Arrival





- Present on Arrival


Any Indicators Present on Arrival: No


History of DVT/PE: No


History of Uncontrolled Diabetes: No


Urinary Catheter: No


History of Decub. Ulcer: No


History Surgical Site Infection Following: None





- Disposition


Have Diagnosis and Disposition been Completed?: Yes


Diagnosis: 


 UTI (urinary tract infection), Hypomagnesemia, Elevated brain natriuretic 

peptide (BNP) level, Hyperkalemia, Subarachnoid hemorrhage, Fall





Disposition: HOSPITALIZED


Disposition Time: 10:55


Patient Plan: Admission, ICU


Patient Problems: 


 Current Active Problems











Problem Status Onset


 


UTI (urinary tract infection) Acute  


 


Hypomagnesemia Acute  


 


Elevated brain natriuretic peptide (BNP) level Acute  


 


Hyperkalemia Acute  


 


Subarachnoid hemorrhage Acute  


 


Fall Acute  











Condition: STABLE

## 2018-05-10 NOTE — CP.PCM.CON
History of Present Illness





- History of Present Illness


History of Present Illness: 


MICU Consult Note








HPI


Patient is 67yo female with PMhx of HTN, HLD, anxiety disorder, [place] from 

[place] s[place] fall. As per the boyfriend who provided most of history, 

patient was going down the stairs when she tripped and fell several steps 

hitting back of her head. Pt cannot recall the incident. Pt reports she has 

frontal/occipital headache, without fever, chills, blurry vision, n/v, abd 

pain. No other constitutional symptoms. 


CTH done in the ER with small frontal SAH. NSG consulted, no intervention at 

this time. 





PMHx: htn/hyperlipidemia, anxiety


PSHx: As above


Meds as per EMR


FHx NC


ROS as above


Social occasional etoh, denies drug use, smoking





Review of Systems





- Review of Systems


Review of Systems: 





as per HPI





Past Patient History





- Infectious Disease


Hx of Infectious Diseases: None





- Past Social History


Smoking Status: Unknown If Ever Smoked





- CARDIAC


Hx Hypertension: Yes





- PULMONARY


Hx Respiratory Disorders: No





- GENITOURINARY/GYNECOLOGICAL


Other/Comment: breast ca





- PSYCHIATRIC


Hx Psychophysiologic Disorder: No


Hx Substance Use: No





- ANESTHESIA


Hx Anesthesia: Yes


Hx Anesthesia Reactions: No





Meds


Allergies/Adverse Reactions: 


 Allergies











Allergy/AdvReac Type Severity Reaction Status Date / Time


 


Sulfa (Sulfonamide Allergy  RASH Verified 05/10/18 09:43





Antibiotics)     














- Medications


Medications: 


 Current Medications





Sodium Chloride (Sodium Chloride 0.9%)  1,000 mls @ 50 mls/hr IV .Q20H JARRELL


   Last Admin: 05/10/18 11:27 Dose:  50 mls/hr











Physical Exam





- Constitutional


Appears: Non-toxic, No Acute Distress





- Head Exam


Head Exam: NORMAL INSPECTION





- Eye Exam


Eye Exam: EOMI, Normal appearance





- ENT Exam


ENT Exam: Mucous Membranes Moist





- Neck Exam


Neck exam: Positive for: Full Rom





- Respiratory Exam


Respiratory Exam: Clear to Auscultation Bilateral, NORMAL BREATHING PATTERN





- Cardiovascular Exam


Cardiovascular Exam: REGULAR RHYTHM, +S1, +S2





- GI/Abdominal Exam


GI & Abdominal Exam: Normal Bowel Sounds, Soft





- Extremities Exam


Extremities exam: Positive for: normal inspection





- Neurological Exam


Neurological exam: Alert, CN II-XII Intact, Oriented x3





- Psychiatric Exam


Psychiatric exam: Normal Affect





- Skin


Skin Exam: Normal Color, Warm





Results





- Vital Signs


Recent Vital Signs: 


 Last Vital Signs











Temp  97.9 F   05/10/18 09:45


 


Pulse  82   05/10/18 11:53


 


Resp  18   05/10/18 11:53


 


BP  173/94 H  05/10/18 11:53


 


Pulse Ox  97   05/10/18 11:53














- Labs


Result Diagrams: 


 05/10/18 10:00





 05/10/18 10:00


Labs: 


 Laboratory Results - last 24 hr











  05/10/18 05/10/18 05/10/18





  09:39 10:00 10:00


 


WBC    9.7


 


RBC    4.83


 


Hgb    14.4


 


Hct    42.4


 


MCV    87.8


 


MCH    29.8


 


MCHC    34.0


 


RDW    13.5


 


Plt Count    246


 


MPV    11.1 H


 


Gran %    75.1 H


 


Lymph % (Auto)    18.6 L


 


Mono % (Auto)    5.5


 


Eos % (Auto)    0.6 L


 


Baso % (Auto)    0.2


 


Gran #    7.27 H


 


Lymph # (Auto)    1.8


 


Mono # (Auto)    0.5


 


Eos # (Auto)    0.1


 


Baso # (Auto)    0.02


 


Sodium   149 H 


 


Potassium   5.3 H 


 


Chloride   110 H 


 


Carbon Dioxide   29 


 


Anion Gap   16 


 


BUN   21 


 


Creatinine   0.8 


 


Est GFR ( Amer)   > 60 


 


Est GFR (Non-Af Amer)   > 60 


 


POC Glucose (mg/dL)  108  


 


Random Glucose   121 H 


 


Calcium   9.8 


 


Magnesium   1.5 L 


 


Total Bilirubin   1.0 


 


AST   33 


 


ALT   39 


 


Alkaline Phosphatase   94 


 


Lactate Dehydrogenase   590 


 


Total Creatine Kinase   108 


 


Troponin I   < 0.01 


 


NT-Pro-B Natriuret Pep   707 H 


 


Total Protein   7.3 


 


Albumin   4.3 


 


Globulin   3.0 


 


Albumin/Globulin Ratio   1.4 


 


Urine Color   


 


Urine Appearance   


 


Urine pH   


 


Ur Specific Gravity   


 


Urine Protein   


 


Urine Glucose (UA)   


 


Urine Ketones   


 


Urine Blood   


 


Urine Nitrate   


 


Urine Bilirubin   


 


Urine Urobilinogen   


 


Ur Leukocyte Esterase   


 


Urine RBC   


 


Urine WBC   


 


Ur Epithelial Cells   


 


Urine Bacteria   


 


Urine Opiates Screen   


 


Urine Methadone Screen   


 


Ur Barbiturates Screen   


 


Ur Phencyclidine Scrn   


 


Ur Amphetamines Screen   


 


U Benzodiazepines Scrn   


 


U Oth Cocaine Metabols   


 


U Cannabinoids Screen   


 


BBK History Checked   














  05/10/18 05/10/18 05/10/18





  10:19 10:19 11:20


 


WBC   


 


RBC   


 


Hgb   


 


Hct   


 


MCV   


 


MCH   


 


MCHC   


 


RDW   


 


Plt Count   


 


MPV   


 


Gran %   


 


Lymph % (Auto)   


 


Mono % (Auto)   


 


Eos % (Auto)   


 


Baso % (Auto)   


 


Gran #   


 


Lymph # (Auto)   


 


Mono # (Auto)   


 


Eos # (Auto)   


 


Baso # (Auto)   


 


Sodium   


 


Potassium   


 


Chloride   


 


Carbon Dioxide   


 


Anion Gap   


 


BUN   


 


Creatinine   


 


Est GFR ( Amer)   


 


Est GFR (Non-Af Amer)   


 


POC Glucose (mg/dL)   


 


Random Glucose   


 


Calcium   


 


Magnesium   


 


Total Bilirubin   


 


AST   


 


ALT   


 


Alkaline Phosphatase   


 


Lactate Dehydrogenase   


 


Total Creatine Kinase   


 


Troponin I   


 


NT-Pro-B Natriuret Pep   


 


Total Protein   


 


Albumin   


 


Globulin   


 


Albumin/Globulin Ratio   


 


Urine Color   Yellow 


 


Urine Appearance   Clear 


 


Urine pH   6.0 


 


Ur Specific Gravity   <= 1.005 


 


Urine Protein   Negative 


 


Urine Glucose (UA)   Negative 


 


Urine Ketones   Negative 


 


Urine Blood   Negative 


 


Urine Nitrate   Negative 


 


Urine Bilirubin   Negative 


 


Urine Urobilinogen   0.2 


 


Ur Leukocyte Esterase   Trace H 


 


Urine RBC   0 - 2 


 


Urine WBC   5 - 10 


 


Ur Epithelial Cells   3 - 4 


 


Urine Bacteria   Few 


 


Urine Opiates Screen  Negative  


 


Urine Methadone Screen  Negative  


 


Ur Barbiturates Screen  Negative  


 


Ur Phencyclidine Scrn  Negative  


 


Ur Amphetamines Screen  Negative  


 


U Benzodiazepines Scrn  Negative  


 


U Oth Cocaine Metabols  Negative  


 


U Cannabinoids Screen  Negative  


 


BBK History Checked    No verified bt














- Imaging and Cardiology


  ** Chest x-ray


Status: Image reviewed by me, Report reviewed by me





  ** CT scan - head


Status: Image reviewed by me, Report reviewed by me





Assessment & Plan





- Assessment and Plan (Free Text)


Assessment: 





67yo female a/w small SAH





SAH


Syncope/Concussion


HTN


Hyperkalemia


Hypernatremia





- currently afebrile, HD stable, comfortable in NAD, non focal neurological exam


- NSG consulted


- neurology consult pending








Recommend:


- supp o2 as needed


- BP control, would avoid ACE-I/ARB


- IVF 1/2NS


- Kayex 30g PO x 1


- repeat BMP


- ECHO


- Carotid duplex


- MRI brain


- repeat CTH in 6 hours as per neurosurgery


- GI ppx


- DVT ppx, SCDs


- Monitor in MICU

## 2018-05-11 VITALS — RESPIRATION RATE: 20 BRPM

## 2018-05-11 LAB
BASOPHILS # BLD AUTO: 0.01 K/MM3 (ref 0–2)
BASOPHILS NFR BLD: 0.1 % (ref 0–3)
BUN SERPL-MCNC: 17 MG/DL (ref 7–21)
CALCIUM SERPL-MCNC: 8.4 MG/DL (ref 8.4–10.5)
EOSINOPHIL # BLD: 0 10*3/UL (ref 0–0.7)
EOSINOPHIL NFR BLD: 0.5 % (ref 1.5–5)
ERYTHROCYTE [DISTWIDTH] IN BLOOD BY AUTOMATED COUNT: 13.5 % (ref 11.5–14.5)
GFR NON-AFRICAN AMERICAN: > 60
GRANULOCYTES # BLD: 5.37 10*3/UL (ref 1.4–6.5)
GRANULOCYTES NFR BLD: 63.1 % (ref 50–68)
HGB BLD-MCNC: 12.5 G/DL (ref 12–16)
LYMPHOCYTES # BLD: 2.4 10*3/UL (ref 1.2–3.4)
LYMPHOCYTES NFR BLD AUTO: 27.9 % (ref 22–35)
MCH RBC QN AUTO: 28.8 PG (ref 25–35)
MCHC RBC AUTO-ENTMCNC: 33 G/DL (ref 31–37)
MCV RBC AUTO: 87.3 FL (ref 80–105)
MONOCYTES # BLD AUTO: 0.7 10*3/UL (ref 0.1–0.6)
MONOCYTES NFR BLD: 8.4 % (ref 1–6)
PLATELET # BLD: 211 10^3/UL (ref 120–450)
PMV BLD AUTO: 11.1 FL (ref 7–11)
RBC # BLD AUTO: 4.34 10^6/UL (ref 3.5–6.1)
WBC # BLD AUTO: 8.5 10^3/UL (ref 4.5–11)

## 2018-05-11 NOTE — CON
DATE:



HISTORY OF PRESENT ILLNESS:  This is a 68-year-old white female with past

medical history of hypertension and anxiety, breast CA and came here

because she fell in the ship going down the stair and hit her head.  CAT

scan of the head was done, which showed subarachnoid hemorrhage, small, and

patient is doing much better, _____ evaluate the patient.



PAST MEDICAL HISTORY:  Hyperlipidemia, hypertension, anxiety.



ALLERGIES:  SULFA.



SOCIAL HISTORY:  Does not smoke, does not drink.



REVIEW OF SYSTEMS:  Ten-point review of systems was negative except fall

and sustained head trauma.



PHYSICAL EXAMINATION:

HEENT:  Normocephalic, atraumatic.

NECK:  Supple.

NEUROLOGIC:  Alert, awake, and oriented x3.  No aphasia.  Cranial nerves II

through XII are tested. Pupils are reactive.  EOM intact.  Visual fields,

full.  No facial asymmetry.  Tongue is midline.  Motor examination:

Spontaneous movements of all the extremities noted.  Deep tendon reflexes

1+.  Both plantars are downgoing.  Sensory appears intact.  Cerebellar and

gait, deferred.



IMPRESSION:  Traumatic head trauma secondary to fall with subarachnoid

hemorrhage, small.



PLAN:  We will repeat the CAT scan in the morning.



LABORATORY DATA:  WBC 9.7, hemoglobin 14.4, hematocrit 42.4, platelets 246.

Sodium 149, potassium 5.3, chloride 110, CO2 29, glucose 121, BUN 21,

creatinine 0.8, and continue present management.  We will follow up in the

morning.  Continue neuro checks.





__________________________________________

Eze Mayer MD



DD:  05/10/2018 19:02:47

DT:  05/10/2018 21:01:18

Job # [id number]

## 2018-05-11 NOTE — PN
DATE:  05/11/2018



SUBJECTIVE:  The patient is seen and examined at bedside.  She is

comfortable.  She talks full sentences.  She is not in respiratory or

otherwise distress.  She complains on frontal and occipital headache 

which; however, substantially improved with Tylenol.



PHYSICAL EXAMINATION:

VITAL SIGNS:  Heart rate 75, blood pressure 150/71, oxygen saturation 95%

on room air, respiratory rate 15.

HEENT:  ENT:  Head and neck atraumatic.

LUNGS:  Clear to auscultation bilaterally.

HEART:  Regular rate and rhythm.  S1 and S2 normal.

ABDOMEN:  Soft, nontender, nondistended.

MUSCULOSKELETAL:  No C/C/E.

NEUROLOGIC:  The patient moves all extremities spontaneously.

SKIN:  Moist.

PSYCHIATRIC:  The patient is alert, awake and oriented x3.



LABORATORY DATA:  WBC 8.5, hemoglobin 12.5, platelet count 211.  Sodium

146, potassium 3.8, chloride 110, carbon dioxide 27, BUN 17, creatinine

0.7, glucose 105.  Magnesium 1.6.



MEDICATIONS:  Tylenol p.r.n., labetalol p.r.n., Reglan p.r.n., morphine

p.r.n., Zofran p.r.n., Protonix, ceftriaxone, normal saline 50 mL per hour.



CAT scan of the head appears to be unchanged (official report is pending).



ASSESSMENT AND PLAN:  This 68-year-old lady who presented with very traumatic

subarachnoid hemorrhage, which on subsequent CT head, appears to be

decreasing.  The patient currently is asymptomatic.  Her neuro exam is

grossly unremarkable and motor strength in both upper and lower extremities

5/5.  She is alert, awake, maintain thoughtful conversation.  We will

continue target euvolemia, euglycemia, normothermia and oxygen saturation

more than 90%..  Okay to downgrade to Med-Surg.



ccm time 40 min







__________________________________________

Temo Leonard MD



DD:  05/11/2018 8:12:24

DT:  05/11/2018 8:15:42

Job # [id number]

MTDD

## 2018-05-11 NOTE — CT
PROCEDURE:  CT HEAD WITHOUT CONTRAST.



HISTORY:

SAH



COMPARISON:

05/10/2018 



TECHNIQUE:

Axial computed tomography images were obtained through the head/brain 

without intravenous contrast.  



Radiation dose:



Total exam DLP =  mGy-cm.



This CT exam was performed using one or more of the following dose 

reduction techniques: Automated exposure control, adjustment of the 

mA and/or kV according to patient size, and/or use of iterative 

reconstruction technique.



FINDINGS:



HEMORRHAGE:

No intracranial hemorrhage. 



BRAIN:

No mass effect or edema.  No atrophy or chronic microvascular 

ischemic changes.



VENTRICLES:

Unremarkable. No hydrocephalus. 



CALVARIUM:

Unremarkable.



PARANASAL SINUSES:

Unremarkable as visualized. No significant inflammatory changes.



MASTOID AIR CELLS:

Unremarkable as visualized. No inflammatory changes.



OTHER FINDINGS:

None.



IMPRESSION:

Normal CT of the Head.  Resolution of subarachnoid hemorrhage.

## 2018-05-11 NOTE — CARD
--------------- APPROVED REPORT --------------





EXAM: Two-dimensional and M-mode echocardiogram with Doppler and 

color Doppler.



Other Information 

Quality : AverageRhythm : 



INDICATION

Syncope 



2D DIMENSIONS 

Left Atrium (2D)3.8   (1.6-4.0cm)IVSd0.9   (0.7-1.1cm)

LVDd4.3   (3.9-5.9cm)PWd1.0   (0.7-1.1cm)

LVDs2.8   (2.5-4.0cm)FS (%) 34.8   %

LVEF (%)64.0   (>50%)



M-Mode DIMENSIONS 

Aortic Root3.10   (2.2-3.7cm)Aortic Cusp Exc.1.50   (1.5-2.0cm)



Aortic Valve

AoV Peak Golmeoxa343.0cm/s



Mitral Valve

MV E Ewxlgpiz62.4cm/sMV A Uniucsid63.4cm/sE/A ratio1.0



TDI

Lateral E' Peak V12.80cm/sMedial E' Peak V8.97cm/sE/Lateral E'5.9

E/Medial E'8.4



Pulmonary Valve

PV Peak Thcxsxcj79.4cm/sPV Peak Grad.2mmHg



Tricuspid Valve

TR Peak Vmgycozd632fq/sRAP [id number] Peak Gr.25mmHg

[id number]



 LEFT VENTRICLE 

The left ventricle is normal size. There is normal left ventricular 

wall thickness. The left ventricular function is normal. The left 

ventricular ejection fraction is within the normal range. There is 

normal LV segmental wall motion.



 RIGHT VENTRICLE 

The right ventricle is normal size.



 ATRIA 

The left atrium is borderline dilated. The right atrium size is 

normal. The interatrial septum is intact with no evidence for an 

atrial septal defect.



 AORTIC VALVE 

The aortic valve is normal in structure.



 MITRAL VALVE 

The mitral valve is normal in structure.



 TRICUSPID VALVE 

The tricuspid valve is normal in structure. There is trace tricuspid 

regurgitation.



 PULMONIC VALVE 

The pulmonic valve is not well visualized.



 GREAT VESSELS 

The aortic root is normal in size.



 PERICARDIAL EFFUSION 

There is no pericardial effusion.



<Conclusion>

The left ventricle is normal size.

There is normal left ventricular wall thickness.

The left ventricular function is normal.

## 2018-05-11 NOTE — CP.PCM.PN
<Geno Momin - Last Filed: 05/11/18 11:02>





Subjective





- Date & Time of Evaluation


Date of Evaluation: 05/11/18


Time of Evaluation: 07:50





- Subjective


Subjective: 


IM progress note for Dr Richards





Patient with no acute events overnight. States the headache is still there, 

however better than yesterday, and is relieved with Tylenol. Patient is npo 

pending repeat CT. Denies cp, sob, nausea, vomiting or diarrhea. No abdominal 

pain. No events on the tele monitor. 





Objective





- Vital Signs/Intake and Output


Vital Signs (last 24 hours): 


 











Temp Pulse Resp BP Pulse Ox


 


 97.8 F   70   16   120/52 L  92 L


 


 05/11/18 07:06  05/11/18 06:50  05/11/18 06:50  05/11/18 06:00  05/11/18 06:50








Intake and Output: 


 











 05/11/18 05/11/18





 06:59 18:59


 


Intake Total 900 


 


Output Total 400 


 


Balance 500 














- Medications


Medications: 


 Current Medications





Acetaminophen (Tylenol 325mg Tab)  650 mg PO Q6H PRN


   PRN Reason: Pain, Mild (1-3)


   Last Admin: 05/11/18 07:06 Dose:  650 mg


Ceftriaxone Sodium (Rocephin 1 Gram Ivpb)  1 gm in 100 mls @ 100 mls/hr IVPB 

DAILY [facility]


   PRN Reason: Protocol


   Last Admin: 05/11/18 09:48 Dose:  100 mls/hr


Labetalol HCl (Trandate)  5 mg IV Q4H PRN


   PRN Reason: Systolic Blood Pressure


Metoclopramide HCl (Reglan)  10 mg IVP Q6H PRN


   PRN Reason: Nausea/Vomiting


   Last Admin: 05/10/18 17:44 Dose:  10 mg


Morphine Sulfate (Morphine)  2 mg IVP Q4H PRN


   PRN Reason: Pain, moderate (4-7)


   Last Admin: 05/10/18 14:48 Dose:  2 mg


Ondansetron HCl (Zofran Inj)  4 mg IVP Q6H PRN


   PRN Reason: Nausea/Vomiting


   Last Admin: 05/10/18 16:11 Dose:  4 mg


Pantoprazole Sodium (Protonix Inj)  40 mg IVP DAILY [facility]


   Last Admin: 05/11/18 09:47 Dose:  40 mg











- Labs


Labs: 


 





 05/11/18 05:30 





 05/11/18 05:30 











- Constitutional


Appears: No Acute Distress





- Head Exam


Head Exam: ATRAUMATIC, NORMAL INSPECTION, NORMOCEPHALIC





- Eye Exam


Eye Exam: EOMI, Normal appearance, PERRL.  absent: Scleral icterus


Pupil Exam: NORMAL ACCOMODATION





- ENT Exam


ENT Exam: Mucous Membranes Moist





- Neck Exam


Neck Exam: Normal Inspection





- Respiratory Exam


Respiratory Exam: Clear to Ausculation Bilateral, NORMAL BREATHING PATTERN.  

absent: Rales, Rhonchi, Wheezes, Respiratory Distress, Stridor





- Cardiovascular Exam


Cardiovascular Exam: REGULAR RHYTHM, RRR, +S1, +S2.  absent: Gallop, JVD, Rubs, 

Murmur





- GI/Abdominal Exam


GI & Abdominal Exam: Soft, Normal Bowel Sounds.  absent: Distended, Firm, 

Guarding, Rigid, Tenderness





- Extremities Exam


Extremities Exam: Normal Capillary Refill, Normal Inspection.  absent: Pedal 

Edema





- Back Exam


Back Exam: NORMAL INSPECTION





- Neurological Exam


Neurological Exam: Alert, Awake, Oriented x3


Additional comments: 





No focal neurologic deficit.





- Psychiatric Exam


Psychiatric exam: Normal Affect, Normal Mood





- Skin


Skin Exam: Dry, Intact, Normal Color, Warm





Assessment and Plan





- Assessment and Plan (Free Text)


Assessment: 


Patient is 67 yo female with PMHx of HTN, HLD, SVT, breast cancer x2 ( s/p 

surgery, chemo and radiation), anxiety disorder, h/o TIAs brought in post fall 

in cruise [place].








Plan: 


1) Syncope- might be multifactorial in nature ( alcohol- was drinking on cruise 

ship, versus arrhythmia versus dehydration versus uti) 


  - Trop indeterminate, no events on tele, ekg normal.


  - Carotid echo with Bilateral 20-39% proximal ICA stenoses. Antegrade flow in 

both vertebral arteries.


  - C spine and hip/pelvic x-ray with no fractures. 


  - TSH normal, electrolytes correct 


  - UTI is being treated


  - Drug and alcohol normal


  - CT with below finding 


  - Patient to follow up with her private neurologist upon discharge 


  - Cardiologist rec stress test, patient to follow up with her private 

cardiologist 





2) Traumatic SAH- initial CT on arrival with small mount of post traumatic 

subarachnoid hemorrhage on anterior surface of left frontal lobe, repeat CT 6 

hours later with diminishing trace, Another repat CT this morning with 

resolution of the hemorrhage. 


     - Patient to be transferred out of the icu


     - PT eval


     - further rec as per neurology 


      - No intervention as per neurosurgery 


 








2) chronic htn0 will resume home meds 








3) Hypernatremia- resolved








4) Hyperkalemia- resolved 





5) Hypomagnesemia- being repleted. 





6) UTI- 


    - urine culture pending


    - afebrile, no leukocytosis


    - continue Rocephin pending sensitivity.








7) h/o SVT- ekg normal, will monitor tele








8) Elevated pro bnp- echp normal and no signs of chf. 





9) DVT/gi prophylaxis: VTE device and protonix. 





10) Dispo: PT eval, likely discharge home tomorrow.








Patient seen, examined and case discussed with Dr Richards. 





<Kevin Richards - Last Filed: 05/11/18 14:16>





Objective





- Vital Signs/Intake and Output


Vital Signs (last 24 hours): 


 











Temp Pulse Resp BP Pulse Ox


 


 97.8 F   76   13   143/69   97 


 


 05/11/18 07:06  05/11/18 10:00  05/11/18 10:00  05/11/18 09:00  05/11/18 10:00








Intake and Output: 


 











 05/11/18 05/11/18





 06:59 18:59


 


Intake Total 900 120


 


Output Total 400 


 


Balance 500 120














- Medications


Medications: 


 Current Medications





Acetaminophen (Tylenol 325mg Tab)  650 mg PO Q6H PRN


   PRN Reason: Pain, Mild (1-3)


   Last Admin: 05/11/18 13:12 Dose:  650 mg


Atorvastatin Calcium (Lipitor)  20 mg PO DIN JARRELL


Buspirone HCl (Buspar)  5 mg PO DAILY JARRELL


   PRN Reason: Protocol


   Last Admin: 05/11/18 13:06 Dose:  5 mg


Lisinopril (Zestril)  40 mg PO DAILY JARRELL


Metoclopramide HCl (Reglan)  10 mg IVP Q6H PRN


   PRN Reason: Nausea/Vomiting


   Last Admin: 05/10/18 17:44 Dose:  10 mg


Metoprolol Tartrate (Lopressor)  50 mg PO BID JARRELL


Morphine Sulfate (Morphine)  2 mg IVP Q4H PRN


   PRN Reason: Pain, moderate (4-7)


   Last Admin: 05/10/18 14:48 Dose:  2 mg


Ondansetron HCl (Zofran Inj)  4 mg IVP Q6H PRN


   PRN Reason: Nausea/Vomiting


   Last Admin: 05/10/18 16:11 Dose:  4 mg


Pantoprazole Sodium (Protonix Ec Tab)  40 mg PO 0600 JARRELL











- Labs


Labs: 


 





 05/11/18 05:30 





 05/11/18 05:30 











Attending/Attestation





- Attestation


I have personally seen and examined this patient.: Yes


I have fully participated in the care of the patient.: Yes


I have reviewed all pertinent clinical information, including history, physical 

exam and plan: Yes


Notes (Text): 





05/11/18 14:12





Attending note;


Patient seen and examined with resident in ICU.


Patient is alert and awake and oriented.


Denies any headache.


Denies any nausea, vomiting.


Denies any chest pain, shortness of breath.








Patient is 68  year old female with PMHx of HTN, HLD, SVT with ablation, breast 

cancer x2 ( s/p surgery, chemo and radiation), anxiety disorder, h/o TIAs 

brought in post fall in cruise ship.


CT head showed small subarachnoid hemorrhage. Repeat CT showed  resolution of 

hemorrhage. Neurosurgery evaluation appreciated.


Neurology evaluation appreciated.


Patient is medically stable.





History of SVT; currently no significant arrhythmia. Cardiology evaluation 

appreciated.


Borderline troponin; suggested stress test as outpatient.


Echocardiogram normal.


Carotid Doppler without significant stenosis.





Hypertension; continue metoprolol and lisinopril. Monitor blood pressure 

closely.





Transfer the patient out of ICU.


PT evaluation requested.


Possible discharge home tomorrow if clinically stable.





Upon discharge patient will follow-up with PMD in [facility]. Patient will get 

outpatient stress test.





The diagnosis, follow-up plan discussed with patient in detail.

## 2018-05-11 NOTE — CON
DATE:  05/11/2018



CARDIOLOGY CONSULTATION



HISTORY OF PRESENT ILLNESS:  The patient is a 68-year-old woman who had a

syncopal episode while on her cruise ship coming back from [place].



The patient apparently had a syncopal episode, resulted in a fall down of

flight of stairs while going to breakfast, resulted in head trauma and a

subdural hematoma.



The patient does not remember the event much, but has had no angina and no

chest pain prior to.



Her cardiac risk factors include a history of hypertension, treated with

medications as well as borderline diabetes mellitus.  She does have

hypercholesterolemia.



She has a strong family history for CAD, but no previous myocardial

infarction and no cardiac symptoms in the past other than history of SVT,

in which she has undergone ablation x2 at [facility].



She is currently on no medications for arrhythmias.



SOCIAL HISTORY:  The patient denies smoking.



REVIEW OF SYSTEMS:  A 14-point review of systems was reviewed in detail. 

No palpitations, no angina, no shortness of breath.  No edema in the lower

extremities elicited.  No previous syncopal episodes.  There have been 2

episodes of what were thought to be TIAs, in which her neurologist told her

were not TIAs.



PHYSICAL EXAMINATION:

VITAL SIGNS:  Blood pressure is 120/52, heart rate is in the 70s, normal

sinus rhythm.

NECK:  Negative JVD.

LUNGS:  Without rales.

HEART:  S1, S2.

EXTREMITIES:  Without edema.



LABORATORY DATA:  EKG is unremarkable.  Laboratories are notable for

troponin of 0.09 x2.  Glucose is remained elevated varying from 125 to 148.

Hemoglobin is 12.5.



Echocardiogram reveals good LV function with no LV outflow obstruction.



CT scan shows a post-traumatic small subdural hematoma.



IMPRESSION:

1.  Syncope.

2.  Traumatic head trauma with small subdural hematoma.

3.  Indeterminate elevated troponin with a high probability for coronary

artery disease.

4.  History of supraventricular tachycardia, status post ablation.

5.  Atherosclerosis.

6.  Hypercholesterolemia.

7.  Hypertension.



PLAN:  Given these findings, there is no obvious cardiac cause of her

syncope noted so far.  I have discussed with the patient about her high

probability for CAD.  She will need an investigation into her coronary

arteries once her subdural hematoma is resolved.



The patient has expressed interest in going back to her cardiologist down

in [place] to continue her cardiac workup.







__________________________________________

Brooks Coleman MD



DD:  05/11/2018 10:32:53

DT:  05/11/2018 10:37:56

[facility] # [id number]

## 2018-05-11 NOTE — CP.CCUPN
<Randell Klein - Last Filed: 05/11/18 10:44>





CCU Subjective





- Physician Review


Subjective (Free Text): 





ICU Progress Note





Pt seen and examined at bedside. No acute overnight events. Patient denied CP, 

SOB, n/v/d, abdominal pain, fever, chills, HA, dizziness, fatigue, or syncopal 

events.








CCU Objective





- Vital Signs / Intake & Output


Vital Signs (Last 4 hours): 


Vital Signs











  Temp Pulse Resp Pulse Ox


 


 05/11/18 07:06  97.8 F   


 


 05/11/18 06:50   70  16  92 L











Intake and Output (Last 8hrs): 


 Intake & Output











 05/10/18 05/11/18 05/11/18





 22:59 06:59 14:59


 


Intake Total  900 


 


Output Total  400 


 


Balance  500 


 


Intake:   


 


  IV  600 


 


    Left Antecubital  600 


 


  Oral  300 


 


Output:   


 


  Urine  400 


 


    Urine, Voided  400 


 


Other:   


 


  # Voids   


 


    Urine, Voided  1 














- Physical Exam


Head: Positive for: Atraumatic, Normocephalic, Other (no facial tenderness or 

swelling).  Negative for: Tenderness, Contusion, Swelling, Ecchymosis, Abrasion

, Laceration


Pupils: Positive for: PERRL


Extroacular Muscles: Positive for: EOMI


Conjunctiva: Positive for: Normal


Ears: Positive for: NORMAL TM, Normal Canal


Mouth: Positive for: Moist Mucous Membranes


Pharnyx: Negative for: ERYTHEMA, EXUDATE, TONSILS ENLARGED


Nose (External): Positive for: Atraumatic.  Negative for: Abrasion, Contusion, 

Laceration


Nose (Internal): Positive for: Normal Inspection, No Active Bleeding.  Negative 

for: Rhinorrhea, Septal Hematoma, Epistaxis


Neck: Positive for: Normal Range of Motion, Trachea Midline.  Negative for: 

Meningeal Signs, MIDLINE TENDERNESS, Paraspinal Tenderness, Lymphadenopathy


Respiratory/Chest: Positive for: Clear to Auscultation, Good Air Exchange.  

Negative for: Respiratory Distress, Accessory Muscle Use, Wheezes, Decreased 

Breath Sounds, Rales, Retracting, Rhonchi, Tachypneic


Cardiovascular: Positive for: Regular Rate and Rhythm, Normal S1, S2.  Negative 

for: Murmurs


Abdomen: Negative for: Tenderness, Distention, Peritoneal Signs, Rebound, 

Guarding


Back: Positive for: Normal Inspection.  Negative for: CVA Tenderness, Midline 

Tenderness, Paraspinal Tenderness, Pain with Leg Raise, Decubitus Ulcer


Upper Extremity: Positive for: Normal Inspection, Normal ROM, NORMAL PULSES, 

Neurovascularly Intact, Capillary Refill < 2s.  Negative for: Cyanosis, Edema, 

Tenderness, Swelling, Deformity


Lower Extremity: Positive for: Normal Inspection, NORMAL PULSES, Normal ROM, 

Neurovascularly Intact, Capillary Refill < 2 s.  Negative for: Edema, Tenderness

, Swelling, Deformity


Neurological: Positive for: GCS=15, CN II-XII Intact, Speech Normal, Motor Func 

Grossly Intact, Gait Normal, Other (Normal finger to nose test, normal heel to 

shin test, no drift, walking with normal gait and posture, full sensation 

intact to sharp and dull, no focal neurological deficits, GCS 15, NIHSS is 0)


Skin: Positive for: Warm, Dry, Normal Color.  Negative for: Rashes


Psychiatric: Positive for: Alert, Normal Insight, Normal Concentration, Normal 

Affect





- Medications


Active Medications: 


Active Medications











Generic Name Dose Route Start Last Admin





  Trade Name Freq  PRN Reason Stop Dose Admin


 


Acetaminophen  650 mg  05/10/18 14:30  05/11/18 07:06





  Tylenol 325mg Tab  PO   650 mg





  Q6H PRN   Administration





  Pain, Mild (1-3)   


 


Ceftriaxone Sodium  1 gm in 100 mls @ 100 mls/hr  05/11/18 10:00  05/11/18 09:48





  Rocephin 1 Gram Ivpb  IVPB   100 mls/hr





  DAILY JARRELL   Administration





  Protocol   


 


Labetalol HCl  5 mg  05/10/18 13:44  





  Trandate  IV   





  Q4H PRN   





  Systolic Blood Pressure   


 


Metoclopramide HCl  10 mg  05/10/18 16:56  05/10/18 17:44





  Reglan  IVP   10 mg





  Q6H PRN   Administration





  Nausea/Vomiting   


 


Morphine Sulfate  2 mg  05/10/18 14:30  05/10/18 14:48





  Morphine  IVP   2 mg





  Q4H PRN   Administration





  Pain, moderate (4-7)   


 


Ondansetron HCl  4 mg  05/10/18 15:26  05/10/18 16:11





  Zofran Inj  IVP   4 mg





  Q6H PRN   Administration





  Nausea/Vomiting   


 


Pantoprazole Sodium  40 mg  05/12/18 06:00  





  Protonix Ec Tab  PO   





  0600 JARRELL   














- Patient Studies


Lab Studies: 


 Lab Studies











  05/11/18 05/11/18 05/11/18 Range/Units





  05:30 05:30 01:59 


 


WBC   8.5   (4.5-11.0)  10^3/ul


 


RBC   4.34   (3.5-6.1)  10^6/uL


 


Hgb   12.5   (12.0-16.0)  g/dL


 


Hct   37.9   (36.0-48.0)  %


 


MCV   87.3   (80.0-105.0)  fl


 


MCH   28.8   (25.0-35.0)  pg


 


MCHC   33.0   (31.0-37.0)  g/dl


 


RDW   13.5   (11.5-14.5)  %


 


Plt Count   211   (120.0-450.0)  10^3/uL


 


MPV   11.1 H   (7.0-11.0)  fl


 


Gran %   63.1   (50.0-68.0)  %


 


Lymph % (Auto)   27.9   (22.0-35.0)  %


 


Mono % (Auto)   8.4 H   (1.0-6.0)  %


 


Eos % (Auto)   0.5 L   (1.5-5.0)  %


 


Baso % (Auto)   0.1   (0.0-3.0)  %


 


Gran #   5.37   (1.4-6.5)  


 


Lymph # (Auto)   2.4   (1.2-3.4)  


 


Mono # (Auto)   0.7 H   (0.1-0.6)  


 


Eos # (Auto)   0.0   (0.0-0.7)  


 


Baso # (Auto)   0.01   (0.0-2.0)  K/mm3


 


Sodium  146    (132-148)  mmol/L


 


Potassium  3.8    (3.6-5.0)  mmol/L


 


Chloride  110 H    ()  mmol/L


 


Carbon Dioxide  27    (21-33)  mmol/L


 


Anion Gap  14    (10-20)  


 


BUN  17    (7-21)  mg/dL


 


Creatinine  0.7    (0.7-1.2)  mg/dl


 


Est GFR (African Amer)  > 60    


 


Est GFR (Non-Af Amer)  > 60    


 


POC Glucose (mg/dL)    135 H  ()  mg/dL


 


Random Glucose  105    ()  mg/dL


 


Calcium  8.4    (8.4-10.5)  mg/dL


 


Phosphorus  4.2    (2.5-4.5)  mg/dL


 


Magnesium  1.6 L    (1.7-2.2)  mg/dL


 


Troponin I     ng/mL


 


Triglycerides     ()  mg/dL


 


Cholesterol     (130-200)  mg/dL


 


LDL Cholesterol Direct     (0-129)  mg/dL


 


HDL Cholesterol     (29-60)  mg/dL


 


TSH 3rd Generation     (0.46-4.68)  mIU/mL


 


Alcohol, Quantitative     (0-10)  mg/dL


 


Blood Type Confirm     














  05/11/18 05/10/18 05/10/18 Range/Units





  00:30 21:27 17:56 


 


WBC     (4.5-11.0)  10^3/ul


 


RBC     (3.5-6.1)  10^6/uL


 


Hgb     (12.0-16.0)  g/dL


 


Hct     (36.0-48.0)  %


 


MCV     (80.0-105.0)  fl


 


MCH     (25.0-35.0)  pg


 


MCHC     (31.0-37.0)  g/dl


 


RDW     (11.5-14.5)  %


 


Plt Count     (120.0-450.0)  10^3/uL


 


MPV     (7.0-11.0)  fl


 


Gran %     (50.0-68.0)  %


 


Lymph % (Auto)     (22.0-35.0)  %


 


Mono % (Auto)     (1.0-6.0)  %


 


Eos % (Auto)     (1.5-5.0)  %


 


Baso % (Auto)     (0.0-3.0)  %


 


Gran #     (1.4-6.5)  


 


Lymph # (Auto)     (1.2-3.4)  


 


Mono # (Auto)     (0.1-0.6)  


 


Eos # (Auto)     (0.0-0.7)  


 


Baso # (Auto)     (0.0-2.0)  K/mm3


 


Sodium     (132-148)  mmol/L


 


Potassium     (3.6-5.0)  mmol/L


 


Chloride     ()  mmol/L


 


Carbon Dioxide     (21-33)  mmol/L


 


Anion Gap     (10-20)  


 


BUN     (7-21)  mg/dL


 


Creatinine     (0.7-1.2)  mg/dl


 


Est GFR (African Amer)     


 


Est GFR (Non-Af Amer)     


 


POC Glucose (mg/dL)   125 H  148 H  ()  mg/dL


 


Random Glucose     ()  mg/dL


 


Calcium     (8.4-10.5)  mg/dL


 


Phosphorus     (2.5-4.5)  mg/dL


 


Magnesium     (1.7-2.2)  mg/dL


 


Troponin I  0.09    ng/mL


 


Triglycerides     ()  mg/dL


 


Cholesterol     (130-200)  mg/dL


 


LDL Cholesterol Direct     (0-129)  mg/dL


 


HDL Cholesterol     (29-60)  mg/dL


 


TSH 3rd Generation     (0.46-4.68)  mIU/mL


 


Alcohol, Quantitative     (0-10)  mg/dL


 


Blood Type Confirm     














  05/10/18 05/10/18 05/10/18 Range/Units





  16:30 13:50 13:50 


 


WBC     (4.5-11.0)  10^3/ul


 


RBC     (3.5-6.1)  10^6/uL


 


Hgb     (12.0-16.0)  g/dL


 


Hct     (36.0-48.0)  %


 


MCV     (80.0-105.0)  fl


 


MCH     (25.0-35.0)  pg


 


MCHC     (31.0-37.0)  g/dl


 


RDW     (11.5-14.5)  %


 


Plt Count     (120.0-450.0)  10^3/uL


 


MPV     (7.0-11.0)  fl


 


Gran %     (50.0-68.0)  %


 


Lymph % (Auto)     (22.0-35.0)  %


 


Mono % (Auto)     (1.0-6.0)  %


 


Eos % (Auto)     (1.5-5.0)  %


 


Baso % (Auto)     (0.0-3.0)  %


 


Gran #     (1.4-6.5)  


 


Lymph # (Auto)     (1.2-3.4)  


 


Mono # (Auto)     (0.1-0.6)  


 


Eos # (Auto)     (0.0-0.7)  


 


Baso # (Auto)     (0.0-2.0)  K/mm3


 


Sodium     (132-148)  mmol/L


 


Potassium     (3.6-5.0)  mmol/L


 


Chloride     ()  mmol/L


 


Carbon Dioxide     (21-33)  mmol/L


 


Anion Gap     (10-20)  


 


BUN     (7-21)  mg/dL


 


Creatinine     (0.7-1.2)  mg/dl


 


Est GFR (African Amer)     


 


Est GFR (Non-Af Amer)     


 


POC Glucose (mg/dL)     ()  mg/dL


 


Random Glucose     ()  mg/dL


 


Calcium     (8.4-10.5)  mg/dL


 


Phosphorus     (2.5-4.5)  mg/dL


 


Magnesium     (1.7-2.2)  mg/dL


 


Troponin I  0.09  D    ng/mL


 


Triglycerides   127   ()  mg/dL


 


Cholesterol   174   (130-200)  mg/dL


 


LDL Cholesterol Direct   92   (0-129)  mg/dL


 


HDL Cholesterol   54   (29-60)  mg/dL


 


TSH 3rd Generation    1.65  (0.46-4.68)  mIU/mL


 


Alcohol, Quantitative    < 10  (0-10)  mg/dL


 


Blood Type Confirm     














  05/10/18 Range/Units





  12:15 


 


WBC   (4.5-11.0)  10^3/ul


 


RBC   (3.5-6.1)  10^6/uL


 


Hgb   (12.0-16.0)  g/dL


 


Hct   (36.0-48.0)  %


 


MCV   (80.0-105.0)  fl


 


MCH   (25.0-35.0)  pg


 


MCHC   (31.0-37.0)  g/dl


 


RDW   (11.5-14.5)  %


 


Plt Count   (120.0-450.0)  10^3/uL


 


MPV   (7.0-11.0)  fl


 


Gran %   (50.0-68.0)  %


 


Lymph % (Auto)   (22.0-35.0)  %


 


Mono % (Auto)   (1.0-6.0)  %


 


Eos % (Auto)   (1.5-5.0)  %


 


Baso % (Auto)   (0.0-3.0)  %


 


Gran #   (1.4-6.5)  


 


Lymph # (Auto)   (1.2-3.4)  


 


Mono # (Auto)   (0.1-0.6)  


 


Eos # (Auto)   (0.0-0.7)  


 


Baso # (Auto)   (0.0-2.0)  K/mm3


 


Sodium   (132-148)  mmol/L


 


Potassium   (3.6-5.0)  mmol/L


 


Chloride   ()  mmol/L


 


Carbon Dioxide   (21-33)  mmol/L


 


Anion Gap   (10-20)  


 


BUN   (7-21)  mg/dL


 


Creatinine   (0.7-1.2)  mg/dl


 


Est GFR (African Amer)   


 


Est GFR (Non-Af Amer)   


 


POC Glucose (mg/dL)   ()  mg/dL


 


Random Glucose   ()  mg/dL


 


Calcium   (8.4-10.5)  mg/dL


 


Phosphorus   (2.5-4.5)  mg/dL


 


Magnesium   (1.7-2.2)  mg/dL


 


Troponin I   ng/mL


 


Triglycerides   ()  mg/dL


 


Cholesterol   (130-200)  mg/dL


 


LDL Cholesterol Direct   (0-129)  mg/dL


 


HDL Cholesterol   (29-60)  mg/dL


 


TSH 3rd Generation   (0.46-4.68)  mIU/mL


 


Alcohol, Quantitative   (0-10)  mg/dL


 


Blood Type Confirm  O POSITIVE  








 Laboratory Results - last 24 hr











  05/10/18 05/10/18 05/10/18





  12:15 13:50 13:50


 


WBC   


 


RBC   


 


Hgb   


 


Hct   


 


MCV   


 


MCH   


 


MCHC   


 


RDW   


 


Plt Count   


 


MPV   


 


Gran %   


 


Lymph % (Auto)   


 


Mono % (Auto)   


 


Eos % (Auto)   


 


Baso % (Auto)   


 


Gran #   


 


Lymph # (Auto)   


 


Mono # (Auto)   


 


Eos # (Auto)   


 


Baso # (Auto)   


 


Sodium   


 


Potassium   


 


Chloride   


 


Carbon Dioxide   


 


Anion Gap   


 


BUN   


 


Creatinine   


 


Est GFR ( Amer)   


 


Est GFR (Non-Af Amer)   


 


POC Glucose (mg/dL)   


 


Random Glucose   


 


Calcium   


 


Phosphorus   


 


Magnesium   


 


Troponin I   


 


Triglycerides    127


 


Cholesterol    174


 


LDL Cholesterol Direct    92


 


HDL Cholesterol    54


 


TSH 3rd Generation   1.65 


 


Alcohol, Quantitative   < 10 


 


Blood Type Confirm  O POSITIVE  














  05/10/18 05/10/18 05/10/18





  16:30 17:56 21:27


 


WBC   


 


RBC   


 


Hgb   


 


Hct   


 


MCV   


 


MCH   


 


MCHC   


 


RDW   


 


Plt Count   


 


MPV   


 


Gran %   


 


Lymph % (Auto)   


 


Mono % (Auto)   


 


Eos % (Auto)   


 


Baso % (Auto)   


 


Gran #   


 


Lymph # (Auto)   


 


Mono # (Auto)   


 


Eos # (Auto)   


 


Baso # (Auto)   


 


Sodium   


 


Potassium   


 


Chloride   


 


Carbon Dioxide   


 


Anion Gap   


 


BUN   


 


Creatinine   


 


Est GFR ( Amer)   


 


Est GFR (Non-Af Amer)   


 


POC Glucose (mg/dL)   148 H  125 H


 


Random Glucose   


 


Calcium   


 


Phosphorus   


 


Magnesium   


 


Troponin I  0.09  D  


 


Triglycerides   


 


Cholesterol   


 


LDL Cholesterol Direct   


 


HDL Cholesterol   


 


TSH 3rd Generation   


 


Alcohol, Quantitative   


 


Blood Type Confirm   














  05/11/18 05/11/18 05/11/18





  00:30 01:59 05:30


 


WBC    8.5


 


RBC    4.34


 


Hgb    12.5


 


Hct    37.9


 


MCV    87.3


 


MCH    28.8


 


MCHC    33.0


 


RDW    13.5


 


Plt Count    211


 


MPV    11.1 H


 


Gran %    63.1


 


Lymph % (Auto)    27.9


 


Mono % (Auto)    8.4 H


 


Eos % (Auto)    0.5 L


 


Baso % (Auto)    0.1


 


Gran #    5.37


 


Lymph # (Auto)    2.4


 


Mono # (Auto)    0.7 H


 


Eos # (Auto)    0.0


 


Baso # (Auto)    0.01


 


Sodium   


 


Potassium   


 


Chloride   


 


Carbon Dioxide   


 


Anion Gap   


 


BUN   


 


Creatinine   


 


Est GFR ( Amer)   


 


Est GFR (Non-Af Amer)   


 


POC Glucose (mg/dL)   135 H 


 


Random Glucose   


 


Calcium   


 


Phosphorus   


 


Magnesium   


 


Troponin I  0.09  


 


Triglycerides   


 


Cholesterol   


 


LDL Cholesterol Direct   


 


HDL Cholesterol   


 


TSH 3rd Generation   


 


Alcohol, Quantitative   


 


Blood Type Confirm   














  05/11/18





  05:30


 


WBC 


 


RBC 


 


Hgb 


 


Hct 


 


MCV 


 


MCH 


 


MCHC 


 


RDW 


 


Plt Count 


 


MPV 


 


Gran % 


 


Lymph % (Auto) 


 


Mono % (Auto) 


 


Eos % (Auto) 


 


Baso % (Auto) 


 


Gran # 


 


Lymph # (Auto) 


 


Mono # (Auto) 


 


Eos # (Auto) 


 


Baso # (Auto) 


 


Sodium  146


 


Potassium  3.8


 


Chloride  110 H


 


Carbon Dioxide  27


 


Anion Gap  14


 


BUN  17


 


Creatinine  0.7


 


Est GFR ( Amer)  > 60


 


Est GFR (Non-Af Amer)  > 60


 


POC Glucose (mg/dL) 


 


Random Glucose  105


 


Calcium  8.4


 


Phosphorus  4.2


 


Magnesium  1.6 L


 


Troponin I 


 


Triglycerides 


 


Cholesterol 


 


LDL Cholesterol Direct 


 


HDL Cholesterol 


 


TSH 3rd Generation 


 


Alcohol, Quantitative 


 


Blood Type Confirm 











Fingerstick Blood Sugar Results: 135





Critical Care Progress Note





- Nutrition


Nutrition: 


 Nutrition











 Category Date Time Status


 


 Heart Healthy Diet [DIET] Diets  05/11/18 Breakfast Ordered














Assessment/Plan





- Assessment and Plan (Free Text)


Assessment: 





67 yo F with PMH of HTN, HLD, anxiety, SVT and breast CA admitted to the ICU 

for subarachnoid hemorrhage 2/2 to fall. Repeat CT this AM showed resolution of 

subarachnoid hemorrhage. Patient stable overnight will be transferred to med/

surg.





Plan: 





Neuro:


- AAOx3


- Head CT showed subarachnoid hemorrhage in the left frontal lobe


- Repeat head CT this AM showed resolution of subarachnoid hemorrhage


- Maintain normothermia


- Neuro consulted


- Neurosurg consulted - no surgical intervention





CV:


- Hemodynamically stable


- Carotid US and Echo normal


- EKG reviewed and shows NSR


- Labetalol prn for HTN


- Maintain MAP > 65


- Cardiology consulted





Pulm:


- CXR negative


- Maintain O2 > 90%





GI:


- Heart healthy diet


- Reglan prn for nausea


- Protonix for GI PPx





Renal:


- Mg 1.6, repleted


- Monitor electrolytes, replete as needed


- Avoid nephrotoxic medications


- Maintain euvolemia





Heme:


- Monitor H/H


- SCD's for DVT PPx





Endo:


- Maintain euglycemia





ID:


- UA showed trace leukocyte esterase


- Urine cultures pending


- Ceftriaxone IVPB





MSK:


- Hip/pelvis xray negative


- Cervical CT negative





Pt seen and discussed in detail with Dr. Leonard.


Jorge Klein, PGY1








<Temo Leonard - Last Filed: 05/11/18 15:56>





CCU Objective





- Vital Signs / Intake & Output


Vital Signs (Last 4 hours): 


Vital Signs











  Temp Pulse Resp BP Pulse Ox


 


 05/11/18 15:48   76   161/76 H 


 


 05/11/18 14:00  98.2 F  76  20  161/76 H  97











Intake and Output (Last 8hrs): 


 Intake & Output











 05/11/18 05/11/18 05/11/18





 06:59 14:59 22:59


 


Intake Total 900 120 


 


Output Total 400  


 


Balance 500 120 


 


Weight  158 lb 


 


Intake:   


 


    


 


    Left Antecubital 600  


 


  Oral 300 120 


 


Output:   


 


  Urine 400  


 


    Urine, Voided 400  


 


Other:   


 


  # Voids   


 


    Urine, Voided 1 0 


 


  # Bowel Movements  0 














- Medications


Active Medications: 


Active Medications











Generic Name Dose Route Start Last Admin





  Trade Name Freq  PRN Reason Stop Dose Admin


 


Acetaminophen  650 mg  05/10/18 14:30  05/11/18 13:12





  Tylenol 325mg Tab  PO   650 mg





  Q6H PRN   Administration





  Pain, Mild (1-3)   


 


Atorvastatin Calcium  20 mg  05/11/18 17:00  





  Lipitor  PO   





  DIN JARRELL   


 


Buspirone HCl  5 mg  05/11/18 12:15  05/11/18 13:06





  Buspar  PO   5 mg





  DAILY JARRELL   Administration





  Protocol   


 


Lisinopril  40 mg  05/12/18 10:00  





  Zestril  PO   





  DAILY JARRELL   


 


Metoclopramide HCl  10 mg  05/10/18 16:56  05/10/18 17:44





  Reglan  IVP   10 mg





  Q6H PRN   Administration





  Nausea/Vomiting   


 


Metoprolol Tartrate  50 mg  05/11/18 18:00  05/11/18 15:48





  Lopressor  PO   50 mg





  BID JARRELL   Administration


 


Morphine Sulfate  2 mg  05/10/18 14:30  05/10/18 14:48





  Morphine  IVP   2 mg





  Q4H PRN   Administration





  Pain, moderate (4-7)   


 


Ondansetron HCl  4 mg  05/10/18 15:26  05/10/18 16:11





  Zofran Inj  IVP   4 mg





  Q6H PRN   Administration





  Nausea/Vomiting   


 


Pantoprazole Sodium  40 mg  05/12/18 06:00  





  Protonix Ec Tab  PO   





  0600 [facility]   














- Patient Studies


Lab Studies: 


 Lab Studies











  05/11/18 05/11/18 05/11/18 Range/Units





  09:48 05:30 05:30 


 


WBC    8.5  (4.5-11.0)  10^3/ul


 


RBC    4.34  (3.5-6.1)  10^6/uL


 


Hgb    12.5  (12.0-16.0)  g/dL


 


Hct    37.9  (36.0-48.0)  %


 


MCV    87.3  (80.0-105.0)  fl


 


MCH    28.8  (25.0-35.0)  pg


 


MCHC    33.0  (31.0-37.0)  g/dl


 


RDW    13.5  (11.5-14.5)  %


 


Plt Count    211  (120.0-450.0)  10^3/uL


 


MPV    11.1 H  (7.0-11.0)  fl


 


Gran %    63.1  (50.0-68.0)  %


 


Lymph % (Auto)    27.9  (22.0-35.0)  %


 


Mono % (Auto)    8.4 H  (1.0-6.0)  %


 


Eos % (Auto)    0.5 L  (1.5-5.0)  %


 


Baso % (Auto)    0.1  (0.0-3.0)  %


 


Gran #    5.37  (1.4-6.5)  


 


Lymph # (Auto)    2.4  (1.2-3.4)  


 


Mono # (Auto)    0.7 H  (0.1-0.6)  


 


Eos # (Auto)    0.0  (0.0-0.7)  


 


Baso # (Auto)    0.01  (0.0-2.0)  K/mm3


 


Sodium   146   (132-148)  mmol/L


 


Potassium   3.8   (3.6-5.0)  mmol/L


 


Chloride   110 H   ()  mmol/L


 


Carbon Dioxide   27   (21-33)  mmol/L


 


Anion Gap   14   (10-20)  


 


BUN   17   (7-21)  mg/dL


 


Creatinine   0.7   (0.7-1.2)  mg/dl


 


Est GFR (African Amer)   > 60   


 


Est GFR (Non-Af Amer)   > 60   


 


POC Glucose (mg/dL)  81    ()  mg/dL


 


Random Glucose   105   ()  mg/dL


 


Calcium   8.4   (8.4-10.5)  mg/dL


 


Phosphorus   4.2   (2.5-4.5)  mg/dL


 


Magnesium   1.6 L   (1.7-2.2)  mg/dL


 


Troponin I     ng/mL














  05/11/18 05/11/18 05/10/18 Range/Units





  01:59 00:30 21:27 


 


WBC     (4.5-11.0)  10^3/ul


 


RBC     (3.5-6.1)  10^6/uL


 


Hgb     (12.0-16.0)  g/dL


 


Hct     (36.0-48.0)  %


 


MCV     (80.0-105.0)  fl


 


MCH     (25.0-35.0)  pg


 


MCHC     (31.0-37.0)  g/dl


 


RDW     (11.5-14.5)  %


 


Plt Count     (120.0-450.0)  10^3/uL


 


MPV     (7.0-11.0)  fl


 


Gran %     (50.0-68.0)  %


 


Lymph % (Auto)     (22.0-35.0)  %


 


Mono % (Auto)     (1.0-6.0)  %


 


Eos % (Auto)     (1.5-5.0)  %


 


Baso % (Auto)     (0.0-3.0)  %


 


Gran #     (1.4-6.5)  


 


Lymph # (Auto)     (1.2-3.4)  


 


Mono # (Auto)     (0.1-0.6)  


 


Eos # (Auto)     (0.0-0.7)  


 


Baso # (Auto)     (0.0-2.0)  K/mm3


 


Sodium     (132-148)  mmol/L


 


Potassium     (3.6-5.0)  mmol/L


 


Chloride     ()  mmol/L


 


Carbon Dioxide     (21-33)  mmol/L


 


Anion Gap     (10-20)  


 


BUN     (7-21)  mg/dL


 


Creatinine     (0.7-1.2)  mg/dl


 


Est GFR (African Amer)     


 


Est GFR (Non-Af Amer)     


 


POC Glucose (mg/dL)  135 H   125 H  ()  mg/dL


 


Random Glucose     ()  mg/dL


 


Calcium     (8.4-10.5)  mg/dL


 


Phosphorus     (2.5-4.5)  mg/dL


 


Magnesium     (1.7-2.2)  mg/dL


 


Troponin I   0.09   ng/mL














  05/10/18 05/10/18 Range/Units





  17:56 16:30 


 


WBC    (4.5-11.0)  10^3/ul


 


RBC    (3.5-6.1)  10^6/uL


 


Hgb    (12.0-16.0)  g/dL


 


Hct    (36.0-48.0)  %


 


MCV    (80.0-105.0)  fl


 


MCH    (25.0-35.0)  pg


 


MCHC    (31.0-37.0)  g/dl


 


RDW    (11.5-14.5)  %


 


Plt Count    (120.0-450.0)  10^3/uL


 


MPV    (7.0-11.0)  fl


 


Gran %    (50.0-68.0)  %


 


Lymph % (Auto)    (22.0-35.0)  %


 


Mono % (Auto)    (1.0-6.0)  %


 


Eos % (Auto)    (1.5-5.0)  %


 


Baso % (Auto)    (0.0-3.0)  %


 


Gran #    (1.4-6.5)  


 


Lymph # (Auto)    (1.2-3.4)  


 


Mono # (Auto)    (0.1-0.6)  


 


Eos # (Auto)    (0.0-0.7)  


 


Baso # (Auto)    (0.0-2.0)  K/mm3


 


Sodium    (132-148)  mmol/L


 


Potassium    (3.6-5.0)  mmol/L


 


Chloride    ()  mmol/L


 


Carbon Dioxide    (21-33)  mmol/L


 


Anion Gap    (10-20)  


 


BUN    (7-21)  mg/dL


 


Creatinine    (0.7-1.2)  mg/dl


 


Est GFR (African Amer)    


 


Est GFR (Non-Af Amer)    


 


POC Glucose (mg/dL)  148 H   ()  mg/dL


 


Random Glucose    ()  mg/dL


 


Calcium    (8.4-10.5)  mg/dL


 


Phosphorus    (2.5-4.5)  mg/dL


 


Magnesium    (1.7-2.2)  mg/dL


 


Troponin I   0.09  D  ng/mL








 Laboratory Results - last 24 hr











  05/10/18 05/10/18 05/10/18





  16:30 17:56 21:27


 


WBC   


 


RBC   


 


Hgb   


 


Hct   


 


MCV   


 


MCH   


 


MCHC   


 


RDW   


 


Plt Count   


 


MPV   


 


Gran %   


 


Lymph % (Auto)   


 


Mono % (Auto)   


 


Eos % (Auto)   


 


Baso % (Auto)   


 


Gran #   


 


Lymph # (Auto)   


 


Mono # (Auto)   


 


Eos # (Auto)   


 


Baso # (Auto)   


 


Sodium   


 


Potassium   


 


Chloride   


 


Carbon Dioxide   


 


Anion Gap   


 


BUN   


 


Creatinine   


 


Est GFR ( Amer)   


 


Est GFR (Non-Af Amer)   


 


POC Glucose (mg/dL)   148 H  125 H


 


Random Glucose   


 


Calcium   


 


Phosphorus   


 


Magnesium   


 


Troponin I  0.09  D  














  05/11/18 05/11/18 05/11/18





  00:30 01:59 05:30


 


WBC    8.5


 


RBC    4.34


 


Hgb    12.5


 


Hct    37.9


 


MCV    87.3


 


MCH    28.8


 


MCHC    33.0


 


RDW    13.5


 


Plt Count    211


 


MPV    11.1 H


 


Gran %    63.1


 


Lymph % (Auto)    27.9


 


Mono % (Auto)    8.4 H


 


Eos % (Auto)    0.5 L


 


Baso % (Auto)    0.1


 


Gran #    5.37


 


Lymph # (Auto)    2.4


 


Mono # (Auto)    0.7 H


 


Eos # (Auto)    0.0


 


Baso # (Auto)    0.01


 


Sodium   


 


Potassium   


 


Chloride   


 


Carbon Dioxide   


 


Anion Gap   


 


BUN   


 


Creatinine   


 


Est GFR ( Amer)   


 


Est GFR (Non-Af Amer)   


 


POC Glucose (mg/dL)   135 H 


 


Random Glucose   


 


Calcium   


 


Phosphorus   


 


Magnesium   


 


Troponin I  0.09  














  05/11/18 05/11/18





  05:30 09:48


 


WBC  


 


RBC  


 


Hgb  


 


Hct  


 


MCV  


 


MCH  


 


MCHC  


 


RDW  


 


Plt Count  


 


MPV  


 


Gran %  


 


Lymph % (Auto)  


 


Mono % (Auto)  


 


Eos % (Auto)  


 


Baso % (Auto)  


 


Gran #  


 


Lymph # (Auto)  


 


Mono # (Auto)  


 


Eos # (Auto)  


 


Baso # (Auto)  


 


Sodium  146 


 


Potassium  3.8 


 


Chloride  110 H 


 


Carbon Dioxide  27 


 


Anion Gap  14 


 


BUN  17 


 


Creatinine  0.7 


 


Est GFR ( Amer)  > 60 


 


Est GFR (Non-Af Amer)  > 60 


 


POC Glucose (mg/dL)   81


 


Random Glucose  105 


 


Calcium  8.4 


 


Phosphorus  4.2 


 


Magnesium  1.6 L 


 


Troponin I  














Critical Care Progress Note





- Nutrition


Nutrition: 


 Nutrition











 Category Date Time Status


 


 Heart Healthy Diet [DIET] Diets  05/11/18 Breakfast Ordered














Attending/Attestation





- Attestation


I have personally seen and examined this patient.: Yes


I have fully participated in the care of the patient.: Yes


I have reviewed all pertinent clinical information: Yes


Notes (Text): 





05/11/18 15:55


please see Dr. Leonard note

## 2018-05-12 VITALS — DIASTOLIC BLOOD PRESSURE: 77 MMHG | HEART RATE: 73 BPM | SYSTOLIC BLOOD PRESSURE: 160 MMHG

## 2018-05-12 VITALS — OXYGEN SATURATION: 94 % | TEMPERATURE: 97.7 F

## 2018-05-12 LAB
BASOPHILS # BLD AUTO: 0.01 K/MM3 (ref 0–2)
BASOPHILS NFR BLD: 0.1 % (ref 0–3)
BUN SERPL-MCNC: 17 MG/DL (ref 7–21)
CALCIUM SERPL-MCNC: 8.6 MG/DL (ref 8.4–10.5)
EOSINOPHIL # BLD: 0.2 10*3/UL (ref 0–0.7)
EOSINOPHIL NFR BLD: 2 % (ref 1.5–5)
ERYTHROCYTE [DISTWIDTH] IN BLOOD BY AUTOMATED COUNT: 13.7 % (ref 11.5–14.5)
GFR NON-AFRICAN AMERICAN: > 60
GRANULOCYTES # BLD: 3.97 10*3/UL (ref 1.4–6.5)
GRANULOCYTES NFR BLD: 54.3 % (ref 50–68)
HGB BLD-MCNC: 12.4 G/DL (ref 12–16)
LYMPHOCYTES # BLD: 2.6 10*3/UL (ref 1.2–3.4)
LYMPHOCYTES NFR BLD AUTO: 36 % (ref 22–35)
MCH RBC QN AUTO: 28.9 PG (ref 25–35)
MCHC RBC AUTO-ENTMCNC: 33.1 G/DL (ref 31–37)
MCV RBC AUTO: 87.4 FL (ref 80–105)
MONOCYTES # BLD AUTO: 0.6 10*3/UL (ref 0.1–0.6)
MONOCYTES NFR BLD: 7.6 % (ref 1–6)
PLATELET # BLD: 238 10^3/UL (ref 120–450)
PMV BLD AUTO: 10.8 FL (ref 7–11)
RBC # BLD AUTO: 4.29 10^6/UL (ref 3.5–6.1)
WBC # BLD AUTO: 7.3 10^3/UL (ref 4.5–11)

## 2018-05-12 NOTE — CP.PCM.DIS
<Yuliet New - Last Filed: 05/12/18 13:16>





Provider





- Provider


Date of Admission: 


05/10/18 11:51





Attending physician: 


Windy Manning MD





Consults: 





Neurosurgery Dr Zuniga


Neurology Dr. Mayer


ICU Dr. Rosa


Cardiology Dr. Coleman


Time Spent in preparation of Discharge (in minutes): 45





Hospital Course





- Lab Results


Lab Results: 


 Micro Results





05/10/18 23:19   Naris   MRSA Culture (Admit) - Final


                            MRSA NOT DETECTED





 Most Recent Lab Values











WBC  7.3 10^3/ul (4.5-11.0)   05/12/18  06:00    


 


RBC  4.29 10^6/uL (3.5-6.1)   05/12/18  06:00    


 


Hgb  12.4 g/dL (12.0-16.0)   05/12/18  06:00    


 


Hct  37.5 % (36.0-48.0)   05/12/18  06:00    


 


MCV  87.4 fl (80.0-105.0)   05/12/18  06:00    


 


MCH  28.9 pg (25.0-35.0)   05/12/18  06:00    


 


MCHC  33.1 g/dl (31.0-37.0)   05/12/18  06:00    


 


RDW  13.7 % (11.5-14.5)   05/12/18  06:00    


 


Plt Count  238 10^3/uL (120.0-450.0)   05/12/18  06:00    


 


MPV  10.8 fl (7.0-11.0)   05/12/18  06:00    


 


Gran %  54.3 % (50.0-68.0)   05/12/18  06:00    


 


Lymph % (Auto)  36.0 % (22.0-35.0)  H  05/12/18  06:00    


 


Mono % (Auto)  7.6 % (1.0-6.0)  H  05/12/18  06:00    


 


Eos % (Auto)  2.0 % (1.5-5.0)   05/12/18  06:00    


 


Baso % (Auto)  0.1 % (0.0-3.0)   05/12/18  06:00    


 


Gran #  3.97  (1.4-6.5)   05/12/18  06:00    


 


Lymph # (Auto)  2.6  (1.2-3.4)   05/12/18  06:00    


 


Mono # (Auto)  0.6  (0.1-0.6)   05/12/18  06:00    


 


Eos # (Auto)  0.2  (0.0-0.7)   05/12/18  06:00    


 


Baso # (Auto)  0.01 K/mm3 (0.0-2.0)   05/12/18  06:00    


 


Sodium  147 mmol/L (132-148)   05/12/18  06:00    


 


Potassium  4.2 mmol/L (3.6-5.0)   05/12/18  06:00    


 


Chloride  111 mmol/L ()  H  05/12/18  06:00    


 


Carbon Dioxide  27 mmol/L (21-33)   05/12/18  06:00    


 


Anion Gap  14  (10-20)   05/12/18  06:00    


 


BUN  17 mg/dL (7-21)   05/12/18  06:00    


 


Creatinine  0.8 mg/dl (0.7-1.2)   05/12/18  06:00    


 


Est GFR ( Amer)  > 60   05/12/18  06:00    


 


Est GFR (Non-Af Amer)  > 60   05/12/18  06:00    


 


POC Glucose (mg/dL)  89 mg/dL ()   05/12/18  06:48    


 


Random Glucose  95 mg/dL ()   05/12/18  06:00    


 


Calcium  8.6 mg/dL (8.4-10.5)   05/12/18  06:00    


 


Phosphorus  4.2 mg/dL (2.5-4.5)   05/11/18  05:30    


 


Magnesium  1.6 mg/dL (1.7-2.2)  L  05/11/18  05:30    


 


Total Bilirubin  1.0 mg/dL (0.2-1.3)   05/10/18  10:00    


 


AST  33 U/L (14-36)   05/10/18  10:00    


 


ALT  39 U/L (7-56)   05/10/18  10:00    


 


Alkaline Phosphatase  94 U/L ()   05/10/18  10:00    


 


Lactate Dehydrogenase  590 U/L (333-699)   05/10/18  10:00    


 


Total Creatine Kinase  108 U/L ()   05/10/18  10:00    


 


Troponin I  0.09 ng/mL  05/11/18  00:30    


 


NT-Pro-B Natriuret Pep  707 pg/mL (0-450)  H  05/10/18  10:00    


 


Total Protein  7.3 g/dL (5.8-8.3)   05/10/18  10:00    


 


Albumin  4.3 g/dL (3.0-4.8)   05/10/18  10:00    


 


Globulin  3.0 gm/dL  05/10/18  10:00    


 


Albumin/Globulin Ratio  1.4  (1.1-1.8)   05/10/18  10:00    


 


Triglycerides  127 mg/dL ()   05/10/18  13:50    


 


Cholesterol  174 mg/dL (130-200)   05/10/18  13:50    


 


LDL Cholesterol Direct  92 mg/dL (0-129)   05/10/18  13:50    


 


HDL Cholesterol  54 mg/dL (29-60)   05/10/18  13:50    


 


TSH 3rd Generation  1.65 mIU/mL (0.46-4.68)   05/10/18  13:50    


 


Urine Color  Yellow  (YELLOW)   05/10/18  10:19    


 


Urine Appearance  Clear  (CLEAR)   05/10/18  10:19    


 


Urine pH  6.0  (4.7-8.0)   05/10/18  10:19    


 


Ur Specific Gravity  <= 1.005  (1.005-1.035)   05/10/18  10:19    


 


Urine Protein  Negative mg/dL (<30 mg/dL)   05/10/18  10:19    


 


Urine Glucose (UA)  Negative mg/dL (NEGATIVE)   05/10/18  10:19    


 


Urine Ketones  Negative mg/dL (NEGATIVE)   05/10/18  10:19    


 


Urine Blood  Negative  (NEGATIVE)   05/10/18  10:19    


 


Urine Nitrate  Negative  (NEGATIVE)   05/10/18  10:19    


 


Urine Bilirubin  Negative  (NEGATIVE)   05/10/18  10:19    


 


Urine Urobilinogen  0.2 E.U./dL (<1 E.U./dL)   05/10/18  10:19    


 


Ur Leukocyte Esterase  Trace Ga/uL (NEGATIVE)  H  05/10/18  10:19    


 


Urine RBC  0 - 2 /hpf (0-2)   05/10/18  10:19    


 


Urine WBC  5 - 10 /hpf (0-6)   05/10/18  10:19    


 


Ur Epithelial Cells  3 - 4 /hpf (0-5)   05/10/18  10:19    


 


Urine Bacteria  Few  (NEG)   05/10/18  10:19    


 


Urine Opiates Screen  Negative  (NEGATIVE)   05/10/18  10:19    


 


Urine Methadone Screen  Negative  (NEGATIVE)   05/10/18  10:19    


 


Ur Barbiturates Screen  Negative  (NEGATIVE)   05/10/18  10:19    


 


Ur Phencyclidine Scrn  Negative  (NEGATIVE)   05/10/18  10:19    


 


Ur Amphetamines Screen  Negative  (NEGATIVE)   05/10/18  10:19    


 


U Benzodiazepines Scrn  Negative  (NEGATIVE)   05/10/18  10:19    


 


U Oth Cocaine Metabols  Negative  (NEGATIVE)   05/10/18  10:19    


 


U Cannabinoids Screen  Negative  (NEGATIVE)   05/10/18  10:19    


 


Alcohol, Quantitative  < 10 mg/dL (0-10)   05/10/18  13:50    


 


Blood Type  O POSITIVE   05/10/18  11:20    


 


Blood Type Confirm  O POSITIVE   05/10/18  12:15    


 


Antibody Screen  Negative   05/10/18  11:20    


 


BBK History Checked  No verified bt   05/10/18  11:20    














- Hospital Course


Hospital Course: 





 68 F with a PMHx of HTN, HLD, SVT, breast cancer x2 ( s/p surgery, chemo and 

radiation), anxiety disorder, vertigo, and TIAs presented to [facility] ED s[place]p fall on 

cru[place] ship. Patient states she doesn't remember the event, only remembers 

waking up in the hospital. As per patient's , she was walking down the 

stairs on the cruise [place] when her shoe was caught on something causing her to 

trip and fall, hitting the back of her head. As per patient's , she was 

verbal and responsive afterwards, without any rhythmic motions urinary 

incontinence or tongue biting. Syncope was likely multifactorial in nature ( 

alcohol- was drinking on cruise ship, versus arrhythmia versus dehydration 

versus uti). Troponin indeterminate, no events on tele, ekg normal. Noted 

episode of SVT. Cardiology Dr Coleman recommended outpatient Stress Test. Carotid 

echo with Bilateral 20-39% proximal ICA stenoses. Antegrade flow in both 

vertebral arteries. C spine and hip/pelvic x-ray with no fractures. UTI treated 

with Rocephin. Initial CT on arrival with small mount of post traumatic 

subarachnoid hemorrhage on anterior surface of left frontal lobe, repeat CT 6 

hours later with diminishing trace, Another repat CT this morning with 

resolution of the hemorrhage. Neurosurgery, Dr Silver indicated no acute 

interventions at this time. Neurology. Neurology Dr Mayer agreed with medical 

regimen.Patient was walked around unit with a steady gait. Upon DC, patient is 

to follow up with patients own PMD in [place], along with her usual 

Neurologist Dr. Valentine, and her cardiologist Dr. Loera. 





Discharge Exam





- Head Exam


Head Exam: ATRAUMATIC, NORMAL INSPECTION, NORMOCEPHALIC





- Eye Exam


Eye Exam: EOMI, Normal appearance, PERRL


Pupil Exam: NORMAL ACCOMODATION, PERRL





- ENT Exam


ENT Exam: Mucous Membranes Moist





- Respiratory Exam


Respiratory Exam: Clear to PA & Lateral, NORMAL BREATHING PATTERN, UNREMARKABLE





- Cardiovascular Exam


Cardiovascular Exam: REGULAR RHYTHM, +S1, +S2





- GI/Abdominal Exam


GI & Abdominal Exam: Normal Bowel Sounds, Soft.  absent: Tenderness





- Neurological Exam


Neurological exam: Alert, CN II-XII Intact, Oriented x3, Reflexes Normal





- Psychiatric Exam


Psychiatric exam: Normal Affect, Normal Mood





- Skin


Skin Exam: Dry, Intact, Normal Color, Warm





Discharge Plan





- Follow Up Plan


Condition: STABLE


Disposition: HOME/ ROUTINE


Instructions:  Vertigo (a Type of Dizziness), Syncope (Fainting), Subarachnoid 

Hemorrhage


Additional Instructions: 


Follow up with primary care physician one week


Follow up with Neurology within 1 week


Follow up with Cardiology for outpatient stress test within 1 week











<Mark Ramires - Last Filed: 05/12/18 14:36>





Provider





- Provider


Date of Admission: 


05/10/18 11:51





Attending physician: 


Windy Manning MD








Hospital Course





- Lab Results


Lab Results: 


 Micro Results





05/10/18 23:19   Naris   MRSA Culture (Admit) - Final


                            MRSA NOT DETECTED





 Most Recent Lab Values











WBC  7.3 10^3/ul (4.5-11.0)   05/12/18  06:00    


 


RBC  4.29 10^6/uL (3.5-6.1)   05/12/18  06:00    


 


Hgb  12.4 g/dL (12.0-16.0)   05/12/18  06:00    


 


Hct  37.5 % (36.0-48.0)   05/12/18  06:00    


 


MCV  87.4 fl (80.0-105.0)   05/12/18  06:00    


 


MCH  28.9 pg (25.0-35.0)   05/12/18  06:00    


 


MCHC  33.1 g/dl (31.0-37.0)   05/12/18  06:00    


 


RDW  13.7 % (11.5-14.5)   05/12/18  06:00    


 


Plt Count  238 10^3/uL (120.0-450.0)   05/12/18  06:00    


 


MPV  10.8 fl (7.0-11.0)   05/12/18  06:00    


 


Gran %  54.3 % (50.0-68.0)   05/12/18  06:00    


 


Lymph % (Auto)  36.0 % (22.0-35.0)  H  05/12/18  06:00    


 


Mono % (Auto)  7.6 % (1.0-6.0)  H  05/12/18  06:00    


 


Eos % (Auto)  2.0 % (1.5-5.0)   05/12/18  06:00    


 


Baso % (Auto)  0.1 % (0.0-3.0)   05/12/18  06:00    


 


Gran #  3.97  (1.4-6.5)   05/12/18  06:00    


 


Lymph # (Auto)  2.6  (1.2-3.4)   05/12/18  06:00    


 


Mono # (Auto)  0.6  (0.1-0.6)   05/12/18  06:00    


 


Eos # (Auto)  0.2  (0.0-0.7)   05/12/18  06:00    


 


Baso # (Auto)  0.01 K/mm3 (0.0-2.0)   05/12/18  06:00    


 


Sodium  147 mmol/L (132-148)   05/12/18  06:00    


 


Potassium  4.2 mmol/L (3.6-5.0)   05/12/18  06:00    


 


Chloride  111 mmol/L ()  H  05/12/18  06:00    


 


Carbon Dioxide  27 mmol/L (21-33)   05/12/18  06:00    


 


Anion Gap  14  (10-20)   05/12/18  06:00    


 


BUN  17 mg/dL (7-21)   05/12/18  06:00    


 


Creatinine  0.8 mg/dl (0.7-1.2)   05/12/18  06:00    


 


Est GFR ( Amer)  > 60   05/12/18  06:00    


 


Est GFR (Non-Af Amer)  > 60   05/12/18  06:00    


 


POC Glucose (mg/dL)  65 mg/dL ()   05/12/18  11:07    


 


Random Glucose  95 mg/dL ()   05/12/18  06:00    


 


Calcium  8.6 mg/dL (8.4-10.5)   05/12/18  06:00    


 


Phosphorus  4.2 mg/dL (2.5-4.5)   05/11/18  05:30    


 


Magnesium  1.6 mg/dL (1.7-2.2)  L  05/11/18  05:30    


 


Total Bilirubin  1.0 mg/dL (0.2-1.3)   05/10/18  10:00    


 


AST  33 U/L (14-36)   05/10/18  10:00    


 


ALT  39 U/L (7-56)   05/10/18  10:00    


 


Alkaline Phosphatase  94 U/L ()   05/10/18  10:00    


 


Lactate Dehydrogenase  590 U/L (333-699)   05/10/18  10:00    


 


Total Creatine Kinase  108 U/L ()   05/10/18  10:00    


 


Troponin I  0.09 ng/mL  05/11/18  00:30    


 


NT-Pro-B Natriuret Pep  707 pg/mL (0-450)  H  05/10/18  10:00    


 


Total Protein  7.3 g/dL (5.8-8.3)   05/10/18  10:00    


 


Albumin  4.3 g/dL (3.0-4.8)   05/10/18  10:00    


 


Globulin  3.0 gm/dL  05/10/18  10:00    


 


Albumin/Globulin Ratio  1.4  (1.1-1.8)   05/10/18  10:00    


 


Triglycerides  127 mg/dL ()   05/10/18  13:50    


 


Cholesterol  174 mg/dL (130-200)   05/10/18  13:50    


 


LDL Cholesterol Direct  92 mg/dL (0-129)   05/10/18  13:50    


 


HDL Cholesterol  54 mg/dL (29-60)   05/10/18  13:50    


 


TSH 3rd Generation  1.65 mIU/mL (0.46-4.68)   05/10/18  13:50    


 


Urine Color  Yellow  (YELLOW)   05/10/18  10:19    


 


Urine Appearance  Clear  (CLEAR)   05/10/18  10:19    


 


Urine pH  6.0  (4.7-8.0)   05/10/18  10:19    


 


Ur Specific Gravity  <= 1.005  (1.005-1.035)   05/10/18  10:19    


 


Urine Protein  Negative mg/dL (<30 mg/dL)   05/10/18  10:19    


 


Urine Glucose (UA)  Negative mg/dL (NEGATIVE)   05/10/18  10:19    


 


Urine Ketones  Negative mg/dL (NEGATIVE)   05/10/18  10:19    


 


Urine Blood  Negative  (NEGATIVE)   05/10/18  10:19    


 


Urine Nitrate  Negative  (NEGATIVE)   05/10/18  10:19    


 


Urine Bilirubin  Negative  (NEGATIVE)   05/10/18  10:19    


 


Urine Urobilinogen  0.2 E.U./dL (<1 E.U./dL)   05/10/18  10:19    


 


Ur Leukocyte Esterase  Trace Ga/uL (NEGATIVE)  H  05/10/18  10:19    


 


Urine RBC  0 - 2 /hpf (0-2)   05/10/18  10:19    


 


Urine WBC  5 - 10 /hpf (0-6)   05/10/18  10:19    


 


Ur Epithelial Cells  3 - 4 /hpf (0-5)   05/10/18  10:19    


 


Urine Bacteria  Few  (NEG)   05/10/18  10:19    


 


Urine Opiates Screen  Negative  (NEGATIVE)   05/10/18  10:19    


 


Urine Methadone Screen  Negative  (NEGATIVE)   05/10/18  10:19    


 


Ur Barbiturates Screen  Negative  (NEGATIVE)   05/10/18  10:19    


 


Ur Phencyclidine Scrn  Negative  (NEGATIVE)   05/10/18  10:19    


 


Ur Amphetamines Screen  Negative  (NEGATIVE)   05/10/18  10:19    


 


U Benzodiazepines Scrn  Negative  (NEGATIVE)   05/10/18  10:19    


 


U Oth Cocaine Metabols  Negative  (NEGATIVE)   05/10/18  10:19    


 


U Cannabinoids Screen  Negative  (NEGATIVE)   05/10/18  10:19    


 


Alcohol, Quantitative  < 10 mg/dL (0-10)   05/10/18  13:50    


 


Blood Type  O POSITIVE   05/10/18  11:20    


 


Blood Type Confirm  O POSITIVE   05/10/18  12:15    


 


Antibody Screen  Negative   05/10/18  11:20    


 


BBK History Checked  No verified bt   05/10/18  11:20    














Attending/Attestation





- Attestation


I have personally seen and examined this patient.: Yes


I have fully participated in the care of the patient.: Yes


I have reviewed all pertinent clinical information, including history, physical 

exam and plan: Yes


Notes (Text): 


I have seen and examined the patient at bedside. Agree with the above note with 

the following additions/ exceptions: Briefly this is s68 year old female with 

history of vertigo, HTN, HLD, SVT with ablation, breast cancer x2 ( s/p surgery

, chemo and radiation), anxiety disorder, h/o TIAs brought in post fall in 

cruise ship. CT head showed small subarachnoid hemorrhage. Repeat CT showed 

resolution of hemorrhage. Neurosurgery and Neurology evaluation appreciated. 

Patient is medically stable. She did not have arrhythmia. Cardiology evaluation 

appreciated. Patient had borderline troponins. Cardiology recommended 

outpatient stress test. Echo normal. Carotid doppler did not show any 

significant stenosis. Continue metoprolol and lisinopril. PT cleared the 

patient to go home. Upon discharge patient will follow-up with PMD in 

[facility]. 





EV

## 2022-04-07 NOTE — PN
DATE:  05/12/2018



FOLLOWUP



Covering for Dr. Brooks Coleman.



SUBJECTIVE:  The patient denies any headache or blurry vision and denies

dizziness while lying in bed.



PHYSICAL EXAMINATION:

VITAL SIGNS:  Blood pressure 160/77, heart rate 73, temperature 97.7,

respirations 20.

HEENT:  No pallor or icterus.

CHEST:  Clear.

HEART:  S1 and S2 regular.

EXTREMITIES:  No edema.



LABORATORY DATA:  The SMA-7 is within normal limits except for chloride of

111.  Today's hemoglobin, hematocrit, white count and platelet count are

within normal limit.  Head CT scan done yesterday revealed normal head CT

scan with resolution of subarachnoid hemorrhage.



ASSESSMENT:

1.  Syncopal episode.

2.  Trace left subarachnoid hematoma reported on initial CAT scan on

05/10/2018.

3.  History of supraventricular tachycardia, status post ablation.

4.  Hypertension.

5.  Indeterminate troponin elevation.



RECOMMENDATIONS:  Continue current Zestril at 40 mg once a day, Protonix at

40 mg p.o. once a day.  Resume Lopressor and Lipitor.  The patient has

already had outpatient cardiology appointment this coming Wednesday with

Dr. Gaitan.





__________________________________________

Sonido Rubio MD





DD:  05/12/2018 13:04:35

DT:  05/12/2018 13:23:01

Job # [id number] none

## 2024-04-05 ENCOUNTER — NEW REFERRAL (OUTPATIENT)
Dept: URBAN - METROPOLITAN AREA CLINIC 8 | Facility: CLINIC | Age: 74
End: 2024-04-05

## 2024-04-05 VITALS — SYSTOLIC BLOOD PRESSURE: 110 MMHG | DIASTOLIC BLOOD PRESSURE: 70 MMHG

## 2024-04-05 DIAGNOSIS — H43.812: ICD-10-CM

## 2024-04-05 DIAGNOSIS — D31.31: ICD-10-CM

## 2024-04-05 DIAGNOSIS — H43.392: ICD-10-CM

## 2024-04-05 DIAGNOSIS — H52.13: ICD-10-CM

## 2024-04-05 PROCEDURE — 92134 CPTRZ OPH DX IMG PST SGM RTA: CPT

## 2024-04-05 PROCEDURE — 92004 COMPRE OPH EXAM NEW PT 1/>: CPT

## 2024-04-05 PROCEDURE — 92250 FUNDUS PHOTOGRAPHY W/I&R: CPT

## 2024-04-05 ASSESSMENT — TONOMETRY
OD_IOP_MMHG: 11
OS_IOP_MMHG: 12

## 2024-04-05 ASSESSMENT — VISUAL ACUITY
OD_CC: 20/30
OS_CC: 20/25
OS_CC: 20/25-3
OD_CC: 20/25-1

## 2024-05-03 ENCOUNTER — FOLLOW UP (OUTPATIENT)
Dept: URBAN - METROPOLITAN AREA CLINIC 8 | Facility: CLINIC | Age: 74
End: 2024-05-03

## 2024-05-03 DIAGNOSIS — H43.392: ICD-10-CM

## 2024-05-03 DIAGNOSIS — H52.13: ICD-10-CM

## 2024-05-03 DIAGNOSIS — D31.31: ICD-10-CM

## 2024-05-03 DIAGNOSIS — H43.812: ICD-10-CM

## 2024-05-03 PROCEDURE — 92201 OPSCPY EXTND RTA DRAW UNI/BI: CPT

## 2024-05-03 PROCEDURE — 92134 CPTRZ OPH DX IMG PST SGM RTA: CPT

## 2024-05-03 PROCEDURE — 92012 INTRM OPH EXAM EST PATIENT: CPT

## 2024-05-03 ASSESSMENT — VISUAL ACUITY
OS_CC: 20/25-1
OD_CC: 20/30-1

## 2024-05-03 ASSESSMENT — TONOMETRY
OD_IOP_MMHG: 12
OS_IOP_MMHG: 15

## 2024-05-13 NOTE — CT
PROCEDURE:  CT HEAD WITHOUT CONTRAST.



HISTORY:

Follow up



COMPARISON:

Unenhanced head CT 05/10/2018 10:34 a.m.. 



TECHNIQUE:

Axial computed tomography images were obtained through the head/brain 

without intravenous contrast.  



Radiation dose:



Total exam DLP = 810.47 mGy-cm.



This CT exam was performed using one or more of the following dose 

reduction techniques: Automated exposure control, adjustment of the 

mA and/or kV according to patient size, and/or use of iterative 

reconstruction technique.



FINDINGS:



HEMORRHAGE:

Trace left frontal subarachnoid hemorrhage is diminished with minimal 

residual noted. No new intracranial hemorrhage is appreciated 

otherwise. 



BRAIN:

Otherwise, intrinsic signal remains normal in density throughout the 

gray and white matter structures and there is interval edema 

throughout the brain including the supra and infratentorial 

compartments.  Brainstem is unremarkable. No mass-effect is 

identified with midline brain anatomy unremarkable once again appear



VENTRICLES:

Unremarkable. No hydrocephalus. 



CALVARIUM:

Unremarkable.



PARANASAL SINUSES:

Unremarkable as visualized. No significant inflammatory changes.



MASTOID AIR CELLS:

Unremarkable as visualized. No inflammatory changes.



OTHER FINDINGS:

None.



IMPRESSION:

Diminishing trace left subarachnoid hematoma with no new interval 

findings throughout. The examination is otherwise unremarkable. APPTS ARE READY TO BE MADE: [x ] YES    Best Family or Patient Contact (if needed):    Additional Information about above appointments (if needed):    1:   2:   3:     Other comments or requests:

## 2025-08-06 ENCOUNTER — FOLLOW UP (OUTPATIENT)
Dept: URBAN - METROPOLITAN AREA CLINIC 8 | Age: 75
End: 2025-08-06

## 2025-08-06 DIAGNOSIS — D31.31: ICD-10-CM

## 2025-08-06 DIAGNOSIS — H43.812: ICD-10-CM

## 2025-08-06 DIAGNOSIS — H52.13: ICD-10-CM

## 2025-08-06 DIAGNOSIS — H43.392: ICD-10-CM

## 2025-08-06 PROCEDURE — 92134 CPTRZ OPH DX IMG PST SGM RTA: CPT

## 2025-08-06 PROCEDURE — 92014 COMPRE OPH EXAM EST PT 1/>: CPT

## 2025-08-06 PROCEDURE — 92201 OPSCPY EXTND RTA DRAW UNI/BI: CPT

## 2025-08-06 ASSESSMENT — TONOMETRY
OD_IOP_MMHG: 16
OS_IOP_MMHG: 18

## 2025-08-06 ASSESSMENT — VISUAL ACUITY
OD_CC: 20/30-2
OS_CC: 20/30-2